# Patient Record
Sex: FEMALE | Race: WHITE | NOT HISPANIC OR LATINO | Employment: FULL TIME | URBAN - METROPOLITAN AREA
[De-identification: names, ages, dates, MRNs, and addresses within clinical notes are randomized per-mention and may not be internally consistent; named-entity substitution may affect disease eponyms.]

---

## 2022-08-18 ENCOUNTER — TELEPHONE (OUTPATIENT)
Dept: OBSTETRICS AND GYNECOLOGY | Age: 32
End: 2022-08-18

## 2022-08-18 NOTE — TELEPHONE ENCOUNTER
Caller: KAVEH    Relationship:  [employer] REPRESENTATIVE- REGARDING FMLA PAPERWORK    Best call back number:   [phone]    What form or medical record are you requesting: FMLA PAPERWORK       How would you like to receive the form or medical records (pick-up, mail, fax):FAX  If fax, what is the fax number: [phone] MEE LINN      Additional notes:REPRESENTATIVE CALLED ABOUT FMLA PAPERWORK FOR [employer], ON BEHALF OF THE PT. REP STATED THE COMPLETED PAPERWORK FOR PATIENT NEEDS TO HAVE THE END DATE OF POSTPARTUM RECOVERY TIME ONLY. NOT THE ADDITIONAL TIME PROVIDER ALLOTTED FOR PT TO BE OFF WORK.    PAPERWORK CAN BE FAXED [phone] MEE LINN   CALL BACK NUMBER AND CONFIDENTIAL [phone]

## 2023-08-22 ENCOUNTER — OFFICE VISIT (OUTPATIENT)
Dept: OBSTETRICS AND GYNECOLOGY | Age: 33
End: 2023-08-22
Payer: COMMERCIAL

## 2023-08-22 ENCOUNTER — TELEPHONE (OUTPATIENT)
Dept: OBSTETRICS AND GYNECOLOGY | Age: 33
End: 2023-08-22

## 2023-08-22 VITALS
HEIGHT: 64 IN | WEIGHT: 143 LBS | BODY MASS INDEX: 24.41 KG/M2 | DIASTOLIC BLOOD PRESSURE: 68 MMHG | SYSTOLIC BLOOD PRESSURE: 104 MMHG

## 2023-08-22 DIAGNOSIS — Z12.4 SCREENING FOR CERVICAL CANCER: ICD-10-CM

## 2023-08-22 DIAGNOSIS — Z30.09 GENERAL COUNSELING AND ADVICE FOR CONTRACEPTIVE MANAGEMENT: ICD-10-CM

## 2023-08-22 DIAGNOSIS — Z01.419 ENCOUNTER FOR GYNECOLOGICAL EXAMINATION: Primary | ICD-10-CM

## 2023-08-22 RX ORDER — ACETAMINOPHEN AND CODEINE PHOSPHATE 120; 12 MG/5ML; MG/5ML
1 SOLUTION ORAL DAILY
Qty: 84 TABLET | Refills: 3 | Status: SHIPPED | OUTPATIENT
Start: 2023-08-22

## 2023-08-22 RX ORDER — PNV NO.95/FERROUS FUM/FOLIC AC 28MG-0.8MG
1 TABLET ORAL DAILY
Qty: 30 TABLET | Refills: 11 | Status: SHIPPED | OUTPATIENT
Start: 2023-08-22

## 2023-08-22 NOTE — PROGRESS NOTES
Subjective     Chief Complaint   Patient presents with    Gynecologic Exam     Annual:last pap ,refill on ocp's         History of Present Illness    Wellness exam  Thomas Whitman is a very pleasant  33 y.o. female who presents for annual exam.  Mammo Exam age 38, Contraception yes, Exercise yes  Patient is happy on her OCP, might discontinue in the next year if they want to have another child.  I am going to send a prescription for prenatal vitamins as well to start taking 3 to 6 months before they try.  She has no gynecological concerns or complaints.  Overall remains quite healthy.  Her son is doing well..      Obstetric History:  OB History          1    Para   1    Term   1            AB        Living   1         SAB        IAB        Ectopic        Molar        Multiple   0    Live Births   1               Menstrual History:     Patient's last menstrual period was 2023 (approximate).       Sexual History:       Past Medical History:   Diagnosis Date    Abnormal Pap smear of cervix     Ascus, +hpv,2018    Anemia 2019    r/t GI bleeding    ASCUS with positive high risk HPV cervical     2018    Constipation     GI (gastrointestinal bleed) 10/2018-2019    resolved    History of chicken pox     History of shingles     HPV in female     UTI (urinary tract infection)      Past Surgical History:   Procedure Laterality Date    COLONOSCOPY  2019    chronic rectal inflamation    COLONOSCOPY W/ BIOPSIES  2019    friable (with spontaneous bleeding) and granular mucosa in rectum NBIH, per path chronic proctitis    ENDOSCOPY N/A 2020    Procedure: ESOPHAGOGASTRODUODENOSCOPY with cold bx;  Surgeon: Mandy Florence MD;  Location: [facility] ENDOSCOPY;  Service: Gastroenterology;  Laterality: N/A;  pre - epigastric pain, n/vpost - gastritis    WISDOM TOOTH EXTRACTION         SOCIAL Hx:      The following portions of the patient's history were reviewed and updated as appropriate:  "allergies, current medications, past family history, past medical history, past social history, past surgical history and problem list.    Review of Systems        Except as outlined in history of physical illness, patient denies any changes in her GYN, , GI systems.  All other systems reviewed were negative.         Current Outpatient Medications:     acetaminophen (TYLENOL) 325 MG tablet, Take 2 tablets by mouth Every 6 (Six) Hours As Needed for Mild Pain ., Disp: 30 tablet, Rfl: 1    ibuprofen (ADVIL,MOTRIN) 600 MG tablet, Take 1 tablet by mouth Every 6 (Six) Hours., Disp: 30 tablet, Rfl: 1    norethindrone (Reva) 0.35 MG tablet, Take 1 tablet by mouth Daily., Disp: 84 tablet, Rfl: 3    Prenatal Vit-Fe Fumarate-FA (Prenatal Vitamin) 27-0.8 MG tablet, Take 1 capsule by mouth Daily., Disp: 30 tablet, Rfl: 11    prenatal vitamin (prenatal, CLASSIC, vitamin) tablet, Take  by mouth Daily. (Patient not taking: Reported on 8/22/2023), Disp: , Rfl:    Objective   Physical Exam    /68   Ht 162.6 cm (64\")   Wt 64.9 kg (143 lb)   LMP 08/08/2023 (Approximate)   Breastfeeding No   BMI 24.55 kg/mý     General: Patient is alert and oriented and appears overall healthy  Neck: Is supple without thyromegaly, no carotid bruits and no lymphadenopathy  Lungs: Clear bilaterally, no wheezing, rhonchi, or rales.  Respiratory rate is normal  Breast: Even, symmetrical, no lymphadenopathy, no retraction, no masses or cysts  Heart: Regular rate and rhythm are appreciated, no murmurs or rubs are heard  Abdomen: Is soft, without organomegaly, bowel sounds are positive, there is no rebound or guarding and palpation does not produce any discomfort  Back: Nontender without CVA tenderness  Pelvic: External genitalia appear normal and consistent with mature female.  BUS normal                            Vagina is clean dry without discharge and appears adequately estrogenized, no lesions or masses are present                        "  Cervix is noninflamed without discharge or lesions.  There is no cervical motion tenderness.                Uterus is nonenlarged, without tenderness, and no masses or abnormalities are  present               Adnexa are non-enlarged, non tender               Rectal exam reveals adequate sphincter tone and no masses or lesions are appreciated on digital rectal examination.      Annual Well Woman Exam  Patient Active Problem List   Diagnosis    HPV in female    Epigastric pain    Nausea    Gastroesophageal reflux disease without esophagitis                 Assessment & Plan   Diagnoses and all orders for this visit:    1. Encounter for gynecological examination (Primary)    2. Screening for cervical cancer    3. General counseling and advice for contraceptive management    Other orders  -     norethindrone (Reva) 0.35 MG tablet; Take 1 tablet by mouth Daily.  Dispense: 84 tablet; Refill: 3  -     Prenatal Vit-Fe Fumarate-FA (Prenatal Vitamin) 27-0.8 MG tablet; Take 1 capsule by mouth Daily.  Dispense: 30 tablet; Refill: 11      Discussed today's findings and concerns with patient.  Continue to recommend regular exercise including cardiovascular and resistance training as well as  breast self-exam. Wellness lab, mammography, & pap smear, in accordance with age guidelines.    I have encouraged her to call for today's test results if she has not received them within 10 days.  Patient is advised to call with any change in her condition or with any other questions, otherwise return  for annual examination.

## 2023-08-22 NOTE — TELEPHONE ENCOUNTER
Caller: Thomas Gregory    Relationship: Self    Best call back number: [phone]    Who are you requesting to speak with (clinical staff, provider,  specific staff member): GUSTAVO    What was the call regarding: PT RETURNING A CALL

## 2023-08-24 LAB
CYTOLOGIST CVX/VAG CYTO: NORMAL
CYTOLOGY CVX/VAG DOC CYTO: NORMAL
CYTOLOGY CVX/VAG DOC THIN PREP: NORMAL
DX ICD CODE: NORMAL
HIV 1 & 2 AB SER-IMP: NORMAL
HPV I/H RISK 4 DNA CVX QL PROBE+SIG AMP: NEGATIVE
OTHER STN SPEC: NORMAL
STAT OF ADQ CVX/VAG CYTO-IMP: NORMAL

## 2024-02-26 ENCOUNTER — INITIAL PRENATAL (OUTPATIENT)
Dept: OBSTETRICS AND GYNECOLOGY | Age: 34
End: 2024-02-26
Payer: COMMERCIAL

## 2024-02-26 ENCOUNTER — PREP FOR SURGERY (OUTPATIENT)
Dept: OTHER | Facility: HOSPITAL | Age: 34
End: 2024-02-26
Payer: COMMERCIAL

## 2024-02-26 VITALS — SYSTOLIC BLOOD PRESSURE: 126 MMHG | DIASTOLIC BLOOD PRESSURE: 70 MMHG | BODY MASS INDEX: 24.89 KG/M2 | WEIGHT: 145 LBS

## 2024-02-26 DIAGNOSIS — O02.0 BLIGHTED OVUM: Primary | ICD-10-CM

## 2024-02-26 PROCEDURE — 99213 OFFICE O/P EST LOW 20 MIN: CPT | Performed by: OBSTETRICS & GYNECOLOGY

## 2024-02-26 RX ORDER — SODIUM CHLORIDE 0.9 % (FLUSH) 0.9 %
10 SYRINGE (ML) INJECTION EVERY 12 HOURS SCHEDULED
Status: CANCELLED | OUTPATIENT
Start: 2024-02-29

## 2024-02-26 RX ORDER — SODIUM CHLORIDE 0.9 % (FLUSH) 0.9 %
1-10 SYRINGE (ML) INJECTION AS NEEDED
Status: CANCELLED | OUTPATIENT
Start: 2024-02-29

## 2024-02-26 RX ORDER — SODIUM CHLORIDE 9 MG/ML
40 INJECTION, SOLUTION INTRAVENOUS AS NEEDED
Status: CANCELLED | OUTPATIENT
Start: 2024-02-29

## 2024-02-26 NOTE — PROGRESS NOTES
Subjective       History of Present Illness  Thomas Whitman is a 34 y.o. female is being seen today for irregular periods and confirmation of a possible pregnancy.  Unfortunately ultrasound shows a 9-1/2-week gestational sac with no fetal pole.  Patient's not started any vaginal bleeding she is known to be Rh+  Chief Complaint   Patient presents with    Initial Prenatal Visit     New ob visit  with ultrasound, LMP 2023   .        The following portions of the patient's history were reviewed and updated as appropriate: allergies, current medications, past family history, past medical history, past social history, past surgical history and problem list.    PAST MEDICAL HISTORY  Past Medical History:   Diagnosis Date    Abnormal Pap smear of cervix     Ascus, +hpv,2018    Anemia 2019    r/t GI bleeding    ASCUS with positive high risk HPV cervical     2018    Constipation     GI (gastrointestinal bleed) 10/2018-2019    resolved    History of chicken pox     History of shingles     HPV in female     UTI (urinary tract infection)      OB History    Para Term  AB Living   2 1 1     1   SAB IAB Ectopic Molar Multiple Live Births           0 1      # Outcome Date GA Lbr Cortez/2nd Weight Sex Delivery Anes PTL Lv   2 Current            1 Term 22 40w5d 09:24 / 01:51 3300 g (7 lb 4.4 oz) M Vag-Spont EPI N PAWAN      Birth Comments: panda LR 6     Past Surgical History:   Procedure Laterality Date    COLONOSCOPY  2019    chronic rectal inflamation    COLONOSCOPY W/ BIOPSIES  2019    friable (with spontaneous bleeding) and granular mucosa in rectum NBIH, per path chronic proctitis    ENDOSCOPY N/A 2020    Procedure: ESOPHAGOGASTRODUODENOSCOPY with cold bx;  Surgeon: Mandy Florence MD;  Location: [facility] ENDOSCOPY;  Service: Gastroenterology;  Laterality: N/A;  pre - epigastric pain, n/vpost - gastritis    WISDOM TOOTH EXTRACTION       Family History   Problem Relation Age of Onset     Multiple sclerosis Father     Atrial fibrillation Maternal Grandmother     Diabetes Maternal Grandfather     Colon polyps Maternal Uncle     Colon polyps Maternal Uncle     Irritable bowel syndrome Mother     Irritable bowel syndrome Maternal Aunt      Social History     Tobacco Use   Smoking Status Former    Types: Electronic Cigarette   Smokeless Tobacco Current   Tobacco Comments    quit cigarettes 11/2017.  use vaporizer since       Current Outpatient Medications:     acetaminophen (TYLENOL) 325 MG tablet, Take 2 tablets by mouth Every 6 (Six) Hours As Needed for Mild Pain ., Disp: 30 tablet, Rfl: 1    Prenatal Vit-Fe Fumarate-FA (Prenatal Vitamin) 27-0.8 MG tablet, Take 1 capsule by mouth Daily., Disp: 30 tablet, Rfl: 11    ibuprofen (ADVIL,MOTRIN) 600 MG tablet, Take 1 tablet by mouth Every 6 (Six) Hours. (Patient not taking: Reported on 2/26/2024), Disp: 30 tablet, Rfl: 1    norethindrone (Reva) 0.35 MG tablet, Take 1 tablet by mouth Daily. (Patient not taking: Reported on 2/26/2024), Disp: 84 tablet, Rfl: 3    prenatal vitamin (prenatal, CLASSIC, vitamin) tablet, Take  by mouth Daily. (Patient not taking: Reported on 8/22/2023), Disp: , Rfl:   Immunization History   Administered Date(s) Administered    COVID-19 (PFIZER) BIVALENT 12+YRS 05/24/2023    Fluzone (or Fluarix & Flulaval for VFC) >6mos 12/11/2020    Tdap 10/09/2018, 03/30/2022       Review of Systems       Except as outlined in history of physical illness, patient denies any changes in her GYN, , GI systems. All other systems reviewed are negative.    Objective   Physical Exam   Alert and oriented, respirations unlabored, heart regular rate and rhythm   Pelvic per ultrasound      Assessment & Plan   Diagnoses and all orders for this visit:    1. Blighted ovum (Primary)  Reviewed different management options patient would like to proceed with suction D&C  Patient is Rh+                 Orders Placed This Encounter   Procedures    Progesterone      Order Specific Question:   Release to patient     Answer:   Routine Release [[id number]]    OB Panel With HIV     Order Specific Question:   Release to patient     Answer:   Routine Release [[id number]]    HCG, B-subunit, Quantitative     Order Specific Question:   Release to patient     Answer:   Routine Release [[id number]]           EMR Dragon/ Transcription disclaimer:  Much of the encounter note is an electronic transcription/translation of spoken language to printed text. The electronic translation of spoken language may permit erroneous, or at times, nonessential words or phrases to be inadvertently transcribes; Although i have reviewed the note for such errors, some may still exist.

## 2024-02-29 ENCOUNTER — ANESTHESIA (OUTPATIENT)
Dept: PERIOP | Facility: HOSPITAL | Age: 34
End: 2024-02-29
Payer: COMMERCIAL

## 2024-02-29 ENCOUNTER — ANESTHESIA EVENT (OUTPATIENT)
Dept: PERIOP | Facility: HOSPITAL | Age: 34
End: 2024-02-29
Payer: COMMERCIAL

## 2024-02-29 ENCOUNTER — HOSPITAL ENCOUNTER (OUTPATIENT)
Facility: HOSPITAL | Age: 34
Setting detail: HOSPITAL OUTPATIENT SURGERY
Discharge: HOME | End: 2024-02-29
Attending: OBSTETRICS & GYNECOLOGY | Admitting: OBSTETRICS & GYNECOLOGY
Payer: COMMERCIAL

## 2024-02-29 VITALS
SYSTOLIC BLOOD PRESSURE: 107 MMHG | DIASTOLIC BLOOD PRESSURE: 63 MMHG | TEMPERATURE: 98.1 F | OXYGEN SATURATION: 99 % | RESPIRATION RATE: 16 BRPM | HEART RATE: 67 BPM

## 2024-02-29 DIAGNOSIS — O02.0 BLIGHTED OVUM: ICD-10-CM

## 2024-02-29 LAB
ABO GROUP BLD: NORMAL
ANISOCYTOSIS BLD QL: ABNORMAL
BLD GP AB SCN SERPL QL: NEGATIVE
DEPRECATED RDW RBC AUTO: 40 FL (ref 37–54)
ELLIPTOCYTES BLD QL SMEAR: ABNORMAL
EOSINOPHIL # BLD MANUAL: 0.07 10*3/MM3 (ref 0–0.4)
EOSINOPHIL NFR BLD MANUAL: 2 % (ref 0.3–6.2)
ERYTHROCYTE [DISTWIDTH] IN BLOOD BY AUTOMATED COUNT: 12.4 % (ref 12.3–15.4)
HCT VFR BLD AUTO: 41.1 % (ref 34–46.6)
HGB BLD-MCNC: 13.7 G/DL (ref 12–15.9)
LYMPHOCYTES # BLD MANUAL: 1.63 10*3/MM3 (ref 0.7–3.1)
LYMPHOCYTES NFR BLD MANUAL: 8 % (ref 5–12)
MCH RBC QN AUTO: 29.7 PG (ref 26.6–33)
MCHC RBC AUTO-ENTMCNC: 33.3 G/DL (ref 31.5–35.7)
MCV RBC AUTO: 89.2 FL (ref 79–97)
MONOCYTES # BLD: 0.27 10*3/MM3 (ref 0.1–0.9)
MYELOCYTES NFR BLD MANUAL: 0 % (ref 0–0)
NEUTROPHILS # BLD AUTO: 1.37 10*3/MM3 (ref 1.7–7)
NEUTROPHILS NFR BLD MANUAL: 41 % (ref 42.7–76)
NRBC BLD AUTO-RTO: 0 /100 WBC (ref 0–0.2)
PLAT MORPH BLD: NORMAL
PLATELET # BLD AUTO: 131 10*3/MM3 (ref 140–450)
PMV BLD AUTO: 11.9 FL (ref 6–12)
RBC # BLD AUTO: 4.61 10*6/MM3 (ref 3.77–5.28)
RH BLD: POSITIVE
T&S EXPIRATION DATE: NORMAL
VARIANT LYMPHS NFR BLD MANUAL: 3 % (ref 0–5)
VARIANT LYMPHS NFR BLD MANUAL: 46 % (ref 19.6–45.3)
WBC MORPH BLD: NORMAL
WBC NRBC COR # BLD AUTO: 3.33 10*3/MM3 (ref 3.4–10.8)

## 2024-02-29 PROCEDURE — S0260 H&P FOR SURGERY: HCPCS | Performed by: OBSTETRICS & GYNECOLOGY

## 2024-02-29 PROCEDURE — 25010000002 PROPOFOL 200 MG/20ML EMULSION: Performed by: NURSE ANESTHETIST, CERTIFIED REGISTERED

## 2024-02-29 PROCEDURE — 25010000002 MIDAZOLAM PER 1 MG: Performed by: ANESTHESIOLOGY

## 2024-02-29 PROCEDURE — 25010000002 KETOROLAC TROMETHAMINE PER 15 MG: Performed by: NURSE ANESTHETIST, CERTIFIED REGISTERED

## 2024-02-29 PROCEDURE — 86850 RBC ANTIBODY SCREEN: CPT | Performed by: OBSTETRICS & GYNECOLOGY

## 2024-02-29 PROCEDURE — 86901 BLOOD TYPING SEROLOGIC RH(D): CPT | Performed by: OBSTETRICS & GYNECOLOGY

## 2024-02-29 PROCEDURE — 25010000002 DEXAMETHASONE SODIUM PHOSPHATE 20 MG/5ML SOLUTION: Performed by: NURSE ANESTHETIST, CERTIFIED REGISTERED

## 2024-02-29 PROCEDURE — 86900 BLOOD TYPING SEROLOGIC ABO: CPT | Performed by: OBSTETRICS & GYNECOLOGY

## 2024-02-29 PROCEDURE — 25010000002 ONDANSETRON PER 1 MG: Performed by: NURSE ANESTHETIST, CERTIFIED REGISTERED

## 2024-02-29 PROCEDURE — 88305 TISSUE EXAM BY PATHOLOGIST: CPT | Performed by: OBSTETRICS & GYNECOLOGY

## 2024-02-29 PROCEDURE — 25010000002 FENTANYL CITRATE (PF) 50 MCG/ML SOLUTION: Performed by: NURSE ANESTHETIST, CERTIFIED REGISTERED

## 2024-02-29 PROCEDURE — 25810000003 LACTATED RINGERS PER 1000 ML: Performed by: ANESTHESIOLOGY

## 2024-02-29 PROCEDURE — 85007 BL SMEAR W/DIFF WBC COUNT: CPT | Performed by: OBSTETRICS & GYNECOLOGY

## 2024-02-29 PROCEDURE — 85025 COMPLETE CBC W/AUTO DIFF WBC: CPT | Performed by: OBSTETRICS & GYNECOLOGY

## 2024-02-29 PROCEDURE — 59812 TREATMENT OF MISCARRIAGE: CPT | Performed by: OBSTETRICS & GYNECOLOGY

## 2024-02-29 RX ORDER — SODIUM CHLORIDE 0.9 % (FLUSH) 0.9 %
3 SYRINGE (ML) INJECTION EVERY 12 HOURS SCHEDULED
Status: DISCONTINUED | OUTPATIENT
Start: 2024-02-29 | End: 2024-02-29 | Stop reason: HOSPADM

## 2024-02-29 RX ORDER — KETOROLAC TROMETHAMINE 30 MG/ML
INJECTION, SOLUTION INTRAMUSCULAR; INTRAVENOUS AS NEEDED
Status: DISCONTINUED | OUTPATIENT
Start: 2024-02-29 | End: 2024-02-29 | Stop reason: SURG

## 2024-02-29 RX ORDER — SODIUM CHLORIDE 0.9 % (FLUSH) 0.9 %
10 SYRINGE (ML) INJECTION EVERY 12 HOURS SCHEDULED
Status: DISCONTINUED | OUTPATIENT
Start: 2024-02-29 | End: 2024-02-29 | Stop reason: HOSPADM

## 2024-02-29 RX ORDER — OXYCODONE AND ACETAMINOPHEN 7.5; 325 MG/1; MG/1
1 TABLET ORAL EVERY 4 HOURS PRN
Status: DISCONTINUED | OUTPATIENT
Start: 2024-02-29 | End: 2024-02-29 | Stop reason: HOSPADM

## 2024-02-29 RX ORDER — LIDOCAINE HYDROCHLORIDE 10 MG/ML
0.5 INJECTION, SOLUTION INFILTRATION; PERINEURAL ONCE AS NEEDED
Status: DISCONTINUED | OUTPATIENT
Start: 2024-02-29 | End: 2024-02-29 | Stop reason: SDUPTHER

## 2024-02-29 RX ORDER — EPHEDRINE SULFATE 50 MG/ML
5 INJECTION, SOLUTION INTRAVENOUS ONCE AS NEEDED
Status: DISCONTINUED | OUTPATIENT
Start: 2024-02-29 | End: 2024-02-29 | Stop reason: HOSPADM

## 2024-02-29 RX ORDER — PROMETHAZINE HYDROCHLORIDE 25 MG/1
25 SUPPOSITORY RECTAL ONCE AS NEEDED
Status: DISCONTINUED | OUTPATIENT
Start: 2024-02-29 | End: 2024-02-29 | Stop reason: HOSPADM

## 2024-02-29 RX ORDER — SODIUM CHLORIDE 9 MG/ML
40 INJECTION, SOLUTION INTRAVENOUS AS NEEDED
Status: DISCONTINUED | OUTPATIENT
Start: 2024-02-29 | End: 2024-02-29 | Stop reason: HOSPADM

## 2024-02-29 RX ORDER — DROPERIDOL 2.5 MG/ML
0.62 INJECTION, SOLUTION INTRAMUSCULAR; INTRAVENOUS
Status: DISCONTINUED | OUTPATIENT
Start: 2024-02-29 | End: 2024-02-29 | Stop reason: HOSPADM

## 2024-02-29 RX ORDER — SODIUM CHLORIDE 0.9 % (FLUSH) 0.9 %
3-10 SYRINGE (ML) INJECTION AS NEEDED
Status: DISCONTINUED | OUTPATIENT
Start: 2024-02-29 | End: 2024-02-29 | Stop reason: HOSPADM

## 2024-02-29 RX ORDER — DEXAMETHASONE SODIUM PHOSPHATE 4 MG/ML
INJECTION, SOLUTION INTRA-ARTICULAR; INTRALESIONAL; INTRAMUSCULAR; INTRAVENOUS; SOFT TISSUE AS NEEDED
Status: DISCONTINUED | OUTPATIENT
Start: 2024-02-29 | End: 2024-02-29 | Stop reason: SURG

## 2024-02-29 RX ORDER — HYDRALAZINE HYDROCHLORIDE 20 MG/ML
5 INJECTION INTRAMUSCULAR; INTRAVENOUS
Status: DISCONTINUED | OUTPATIENT
Start: 2024-02-29 | End: 2024-02-29 | Stop reason: HOSPADM

## 2024-02-29 RX ORDER — DIPHENHYDRAMINE HYDROCHLORIDE 50 MG/ML
12.5 INJECTION INTRAMUSCULAR; INTRAVENOUS
Status: DISCONTINUED | OUTPATIENT
Start: 2024-02-29 | End: 2024-02-29 | Stop reason: HOSPADM

## 2024-02-29 RX ORDER — FENTANYL CITRATE 50 UG/ML
50 INJECTION, SOLUTION INTRAMUSCULAR; INTRAVENOUS ONCE AS NEEDED
Status: DISCONTINUED | OUTPATIENT
Start: 2024-02-29 | End: 2024-02-29 | Stop reason: HOSPADM

## 2024-02-29 RX ORDER — NALOXONE HCL 0.4 MG/ML
0.2 VIAL (ML) INJECTION AS NEEDED
Status: DISCONTINUED | OUTPATIENT
Start: 2024-02-29 | End: 2024-02-29 | Stop reason: HOSPADM

## 2024-02-29 RX ORDER — LIDOCAINE HYDROCHLORIDE 10 MG/ML
0.5 INJECTION, SOLUTION INFILTRATION; PERINEURAL ONCE AS NEEDED
Status: DISCONTINUED | OUTPATIENT
Start: 2024-02-29 | End: 2024-02-29 | Stop reason: HOSPADM

## 2024-02-29 RX ORDER — FAMOTIDINE 10 MG/ML
20 INJECTION, SOLUTION INTRAVENOUS ONCE
Status: COMPLETED | OUTPATIENT
Start: 2024-02-29 | End: 2024-02-29

## 2024-02-29 RX ORDER — LIDOCAINE HYDROCHLORIDE 20 MG/ML
INJECTION, SOLUTION INFILTRATION; PERINEURAL AS NEEDED
Status: DISCONTINUED | OUTPATIENT
Start: 2024-02-29 | End: 2024-02-29 | Stop reason: SURG

## 2024-02-29 RX ORDER — HYDROMORPHONE HYDROCHLORIDE 1 MG/ML
0.5 INJECTION, SOLUTION INTRAMUSCULAR; INTRAVENOUS; SUBCUTANEOUS
Status: DISCONTINUED | OUTPATIENT
Start: 2024-02-29 | End: 2024-02-29 | Stop reason: HOSPADM

## 2024-02-29 RX ORDER — PROMETHAZINE HYDROCHLORIDE 25 MG/1
25 TABLET ORAL ONCE AS NEEDED
Status: DISCONTINUED | OUTPATIENT
Start: 2024-02-29 | End: 2024-02-29 | Stop reason: HOSPADM

## 2024-02-29 RX ORDER — FENTANYL CITRATE 50 UG/ML
INJECTION, SOLUTION INTRAMUSCULAR; INTRAVENOUS AS NEEDED
Status: DISCONTINUED | OUTPATIENT
Start: 2024-02-29 | End: 2024-02-29 | Stop reason: SURG

## 2024-02-29 RX ORDER — FENTANYL CITRATE 50 UG/ML
50 INJECTION, SOLUTION INTRAMUSCULAR; INTRAVENOUS
Status: DISCONTINUED | OUTPATIENT
Start: 2024-02-29 | End: 2024-02-29 | Stop reason: HOSPADM

## 2024-02-29 RX ORDER — MAGNESIUM HYDROXIDE 1200 MG/15ML
LIQUID ORAL AS NEEDED
Status: DISCONTINUED | OUTPATIENT
Start: 2024-02-29 | End: 2024-02-29 | Stop reason: HOSPADM

## 2024-02-29 RX ORDER — ONDANSETRON 2 MG/ML
4 INJECTION INTRAMUSCULAR; INTRAVENOUS ONCE AS NEEDED
Status: DISCONTINUED | OUTPATIENT
Start: 2024-02-29 | End: 2024-02-29 | Stop reason: HOSPADM

## 2024-02-29 RX ORDER — ACETAMINOPHEN AND CODEINE PHOSPHATE 300; 30 MG/1; MG/1
2 TABLET ORAL EVERY 6 HOURS PRN
Qty: 12 TABLET | Refills: 0 | Status: SHIPPED | OUTPATIENT
Start: 2024-02-29 | End: 2025-02-28

## 2024-02-29 RX ORDER — PROPOFOL 10 MG/ML
INJECTION, EMULSION INTRAVENOUS AS NEEDED
Status: DISCONTINUED | OUTPATIENT
Start: 2024-02-29 | End: 2024-02-29 | Stop reason: SURG

## 2024-02-29 RX ORDER — MIDAZOLAM HYDROCHLORIDE 1 MG/ML
1 INJECTION INTRAMUSCULAR; INTRAVENOUS
Status: DISCONTINUED | OUTPATIENT
Start: 2024-02-29 | End: 2024-02-29 | Stop reason: SDUPTHER

## 2024-02-29 RX ORDER — SODIUM CHLORIDE, SODIUM LACTATE, POTASSIUM CHLORIDE, CALCIUM CHLORIDE 600; 310; 30; 20 MG/100ML; MG/100ML; MG/100ML; MG/100ML
9 INJECTION, SOLUTION INTRAVENOUS CONTINUOUS
Status: DISCONTINUED | OUTPATIENT
Start: 2024-02-29 | End: 2024-02-29 | Stop reason: SDUPTHER

## 2024-02-29 RX ORDER — ONDANSETRON 2 MG/ML
INJECTION INTRAMUSCULAR; INTRAVENOUS AS NEEDED
Status: DISCONTINUED | OUTPATIENT
Start: 2024-02-29 | End: 2024-02-29 | Stop reason: SURG

## 2024-02-29 RX ORDER — SODIUM CHLORIDE 0.9 % (FLUSH) 0.9 %
1-10 SYRINGE (ML) INJECTION AS NEEDED
Status: DISCONTINUED | OUTPATIENT
Start: 2024-02-29 | End: 2024-02-29 | Stop reason: HOSPADM

## 2024-02-29 RX ORDER — FLUMAZENIL 0.1 MG/ML
0.2 INJECTION INTRAVENOUS AS NEEDED
Status: DISCONTINUED | OUTPATIENT
Start: 2024-02-29 | End: 2024-02-29 | Stop reason: HOSPADM

## 2024-02-29 RX ORDER — FAMOTIDINE 10 MG/ML
20 INJECTION, SOLUTION INTRAVENOUS ONCE
Status: DISCONTINUED | OUTPATIENT
Start: 2024-02-29 | End: 2024-02-29 | Stop reason: SDUPTHER

## 2024-02-29 RX ORDER — LABETALOL HYDROCHLORIDE 5 MG/ML
5 INJECTION, SOLUTION INTRAVENOUS
Status: DISCONTINUED | OUTPATIENT
Start: 2024-02-29 | End: 2024-02-29 | Stop reason: HOSPADM

## 2024-02-29 RX ORDER — MIDAZOLAM HYDROCHLORIDE 1 MG/ML
1 INJECTION INTRAMUSCULAR; INTRAVENOUS
Status: DISCONTINUED | OUTPATIENT
Start: 2024-02-29 | End: 2024-02-29 | Stop reason: HOSPADM

## 2024-02-29 RX ORDER — HYDROCODONE BITARTRATE AND ACETAMINOPHEN 5; 325 MG/1; MG/1
1 TABLET ORAL ONCE AS NEEDED
Status: DISCONTINUED | OUTPATIENT
Start: 2024-02-29 | End: 2024-02-29 | Stop reason: HOSPADM

## 2024-02-29 RX ORDER — SODIUM CHLORIDE, SODIUM LACTATE, POTASSIUM CHLORIDE, CALCIUM CHLORIDE 600; 310; 30; 20 MG/100ML; MG/100ML; MG/100ML; MG/100ML
9 INJECTION, SOLUTION INTRAVENOUS CONTINUOUS
Status: DISCONTINUED | OUTPATIENT
Start: 2024-02-29 | End: 2024-02-29 | Stop reason: HOSPADM

## 2024-02-29 RX ORDER — IPRATROPIUM BROMIDE AND ALBUTEROL SULFATE 2.5; .5 MG/3ML; MG/3ML
3 SOLUTION RESPIRATORY (INHALATION) ONCE AS NEEDED
Status: DISCONTINUED | OUTPATIENT
Start: 2024-02-29 | End: 2024-02-29 | Stop reason: HOSPADM

## 2024-02-29 RX ORDER — FENTANYL CITRATE 50 UG/ML
50 INJECTION, SOLUTION INTRAMUSCULAR; INTRAVENOUS ONCE AS NEEDED
Status: DISCONTINUED | OUTPATIENT
Start: 2024-02-29 | End: 2024-02-29 | Stop reason: SDUPTHER

## 2024-02-29 RX ADMIN — DEXAMETHASONE SODIUM PHOSPHATE 8 MG: 4 INJECTION, SOLUTION INTRAMUSCULAR; INTRAVENOUS at 09:17

## 2024-02-29 RX ADMIN — LIDOCAINE HYDROCHLORIDE 100 MG: 20 INJECTION, SOLUTION INFILTRATION; PERINEURAL at 09:10

## 2024-02-29 RX ADMIN — FENTANYL CITRATE 50 MCG: 50 INJECTION, SOLUTION INTRAMUSCULAR; INTRAVENOUS at 09:13

## 2024-02-29 RX ADMIN — PROPOFOL 200 MG: 10 INJECTION, EMULSION INTRAVENOUS at 09:10

## 2024-02-29 RX ADMIN — ONDANSETRON 4 MG: 2 INJECTION INTRAMUSCULAR; INTRAVENOUS at 09:17

## 2024-02-29 RX ADMIN — FAMOTIDINE 20 MG: 10 INJECTION INTRAVENOUS at 08:25

## 2024-02-29 RX ADMIN — SODIUM CHLORIDE, POTASSIUM CHLORIDE, SODIUM LACTATE AND CALCIUM CHLORIDE 9 ML/HR: 600; 310; 30; 20 INJECTION, SOLUTION INTRAVENOUS at 08:25

## 2024-02-29 RX ADMIN — KETOROLAC TROMETHAMINE 30 MG: 30 INJECTION, SOLUTION INTRAMUSCULAR; INTRAVENOUS at 09:17

## 2024-02-29 RX ADMIN — MIDAZOLAM 1 MG: 1 INJECTION INTRAMUSCULAR; INTRAVENOUS at 08:25

## 2024-02-29 NOTE — ANESTHESIA PREPROCEDURE EVALUATION
Anesthesia Evaluation     Patient summary reviewed and Nursing notes reviewed   no history of anesthetic complications:   NPO Solid Status: > 8 hours  NPO Liquid Status: > 2 hours           Airway   Mallampati: II  TM distance: >3 FB  Neck ROM: full  No difficulty expected  Dental - normal exam     Pulmonary - negative pulmonary ROS and normal exam   (-) rhonchi, decreased breath sounds, wheezes, rales, stridor  Cardiovascular - negative cardio ROS and normal exam    NYHA Classification: I  Rhythm: regular  Rate: normal    (-) murmur, weak pulses, friction rub, systolic click, carotid bruits, JVD, peripheral edema      Neuro/Psych- negative ROS  GI/Hepatic/Renal/Endo    (+) GERD    Musculoskeletal (-) negative ROS    Abdominal    Substance History - negative use     OB/GYN    (-)  Pregnant        Other        (-) blood dyscrasia                Anesthesia Plan    ASA 2     general     intravenous induction     Anesthetic plan, risks, benefits, and alternatives have been provided, discussed and informed consent has been obtained with: patient.      CODE STATUS:

## 2024-02-29 NOTE — H&P
Richi Whitman  : 1990  MRN: [id number]  CSN: [id number]    History and Physical  No chief complaint on file.    Subjective   Thomas Whitman is a 34 y.o. year old  who present for surgery due to blighted ovum with incomplete miscarriage patient was evaluated this week and found to have an empty gestational sac with no fetal development.  According to last menstrual period gestational age there should have been fetal pole and cardiac activity and none was seen.  The sac was empty.  Patient is known to be Rh+.  Risk benefits potential complications of suction D&C reviewed..    Past Medical History:   Diagnosis Date    Abnormal Pap smear of cervix     Ascus, +hpv,2018    Anemia 2019    r/t GI bleeding    ASCUS with positive high risk HPV cervical     2018    Constipation     GI (gastrointestinal bleed) 10/2018-2019    resolved    History of chicken pox     History of shingles     HPV in female     UTI (urinary tract infection)      Past Surgical History:   Procedure Laterality Date    COLONOSCOPY  2019    chronic rectal inflamation    COLONOSCOPY W/ BIOPSIES  2019    friable (with spontaneous bleeding) and granular mucosa in rectum NBIH, per path chronic proctitis    ENDOSCOPY N/A 2020    Procedure: ESOPHAGOGASTRODUODENOSCOPY with cold bx;  Surgeon: Mandy Florence MD;  Location: [facility] ENDOSCOPY;  Service: Gastroenterology;  Laterality: N/A;  pre - epigastric pain, n/vpost - gastritis    WISDOM TOOTH EXTRACTION       Social History    Tobacco Use      Smoking status: Former        Types: Electronic Cigarette      Smokeless tobacco: Current      Tobacco comments: quit cigarettes 2017.  use vaporizer since    No current facility-administered medications for this encounter.    Allergies   Allergen Reactions    Amoxicillin Itching    Macrobid [Nitrofurantoin Monohyd Macro] Itching    Sulfa Antibiotics Itching       Review of Systems      Except as outlined in  history of physical illness, patient denies any changes in her GYN, , GI systems. All other systems reviewed are negative.        Objective   LMP 12/23/2023   General: well developed; well nourished  no acute distress  appears stated age   Heart: regular rate and rhythm, S1, S2 normal, no murmur, click, rub or gallop   Lungs: breathing is unlabored  clear to auscultation bilaterally   Abdomen: soft, non-tender; no masses  no umbilical or inguinal hernias are present  no hepato-splenomegaly   Pelvis:: Clinical staff was present for exam  External genitalia normal vagina with a little bit of blood in the upper vault, cervix is partially dilated, uterus is 7 weeks size, adnexa nonenlarged nontender rectal exam confirms   Labs  Lab Results   Component Value Date     07/08/2022    HGB 9.6 (L) 07/08/2022    HCT 29.5 (L) 07/08/2022    WBC 16.40 (H) 07/08/2022     02/06/2020    K 3.2 (L) 02/06/2020     02/06/2020    CO2 24.8 02/06/2020    BUN 10 02/06/2020    CREATININE 0.64 02/06/2020    GLUCOSE 96 02/06/2020    ALBUMIN 4.90 02/06/2020    CALCIUM 9.0 02/06/2020    AST 21 02/06/2020    ALT 17 02/06/2020    BILITOT 0.5 02/06/2020        Assessment   Blighted ovum, empty gestational sac     Plan   Admit for suction D&C.  Risk benefits potential complications reviewed    Tye Barkley MD  2/29/2024  07:19 EST       EMR Dragon/ Transcription disclaimer:  Much of the encounter note is an electronic transcription/translation of spoken language to printed text. The electronic translation of spoken language may permit erroneous, or at times, nonessential words or phrases to be inadvertently transcribes; Although i have reviewed the note for such errors, some may still exist.

## 2024-02-29 NOTE — ANESTHESIA PROCEDURE NOTES
Airway  Urgency: elective    Date/Time: 2/29/2024 9:11 AM  Airway not difficult    General Information and Staff    Patient location during procedure: OR  Anesthesiologist: Tye Centeno MD  CRNA/CAA: Syed Escobar CRNA    Indications and Patient Condition  Indications for airway management: airway protection    Preoxygenated: yes  Mask difficulty assessment: 1 - vent by mask    Final Airway Details  Final airway type: supraglottic airway      Successful airway: unique  Size 3     Number of attempts at approach: 1  Assessment: lips, teeth, and gum same as pre-op and atraumatic intubation    Additional Comments  Pre 02 100%, SIVI, atraumatic intubation, BLBS, Positive ETC02.

## 2024-02-29 NOTE — ANESTHESIA POSTPROCEDURE EVALUATION
Patient: Thomas Whitman    Procedure Summary       Date: 02/29/24 Room / Location: [facility] OR  / [facility] MAIN OR    Anesthesia Start: 0904 Anesthesia Stop: 0938    Procedure: SUCTION  DILATATION AND CURETTAGE (Vagina) Diagnosis:       Blighted ovum      (Blighted ovum [O02.0])    Surgeons: Tye Barkley MD Provider: Tye Centeno MD    Anesthesia Type: general ASA Status: 2            Anesthesia Type: general    Vitals  Vitals Value Taken Time   /73 02/29/24 0945   Temp     Pulse 68 02/29/24 0946   Resp     SpO2 99 % 02/29/24 0946   Vitals shown include unfiled device data.        Post Anesthesia Care and Evaluation    Patient location during evaluation: PACU  Patient participation: complete - patient participated  Level of consciousness: awake and alert  Pain management: adequate    Airway patency: patent  Anesthetic complications: No anesthetic complications  PONV Status: controlled  Cardiovascular status: acceptable  Respiratory status: acceptable  Hydration status: acceptable    Comments: /66 (BP Location: Right arm, Patient Position: Sitting)   Pulse 68   Temp 36.8 °C (98.3 °F) (Oral)   Resp 18   LMP 12/23/2023   SpO2 100%

## 2024-03-01 LAB
LAB AP CASE REPORT: NORMAL
PATH REPORT.FINAL DX SPEC: NORMAL
PATH REPORT.GROSS SPEC: NORMAL

## 2024-03-04 NOTE — PROGRESS NOTES
Pt c/o some noticeable pelvic pain when she is having a bowel movement. Pt wants to know if this is normal? Please advise

## 2024-03-04 NOTE — PROGRESS NOTES
Please let Thomas know pathology report was consistent with blighted ovum as we discussed.  Hopefully she is doing well.

## 2024-03-06 ENCOUNTER — OFFICE VISIT (OUTPATIENT)
Dept: OBSTETRICS AND GYNECOLOGY | Age: 34
End: 2024-03-06
Payer: COMMERCIAL

## 2024-03-06 ENCOUNTER — TELEPHONE (OUTPATIENT)
Dept: OBSTETRICS AND GYNECOLOGY | Age: 34
End: 2024-03-06

## 2024-03-06 VITALS
HEIGHT: 64 IN | DIASTOLIC BLOOD PRESSURE: 68 MMHG | SYSTOLIC BLOOD PRESSURE: 114 MMHG | BODY MASS INDEX: 24.92 KG/M2 | WEIGHT: 146 LBS

## 2024-03-06 DIAGNOSIS — Z98.890 POST-OPERATIVE STATE: Primary | ICD-10-CM

## 2024-03-06 RX ORDER — FEXOFENADINE HCL 180 MG/1
180 TABLET ORAL EVERY 24 HOURS
COMMUNITY

## 2024-03-06 RX ORDER — METHYLERGONOVINE MALEATE 0.2 MG/1
0.2 TABLET ORAL 2 TIMES DAILY
Qty: 10 TABLET | Refills: 0 | Status: SHIPPED | OUTPATIENT
Start: 2024-03-06 | End: 2024-03-11

## 2024-03-06 RX ORDER — CEPHALEXIN 500 MG/1
500 CAPSULE ORAL 4 TIMES DAILY
Qty: 20 CAPSULE | Refills: 0 | Status: SHIPPED | OUTPATIENT
Start: 2024-03-06 | End: 2024-03-11

## 2024-03-06 NOTE — TELEPHONE ENCOUNTER
"Pt calling stating she had a D&C on 2/29/24 & her pain has gotten worse. Pt stating the first few days she felt ok and was able to do her normal daily activities. Pt stating the last few days she has had spontaneous sharp pelvic pains with \"blueberry\" sized blood clots. Pt asking if this is normal? Please advise.   "

## 2024-03-06 NOTE — PROGRESS NOTES
Subjective       History of Present Illness  Thomas Whitman is a 34 y.o. female is being seen today for postoperative evaluation from her suction D&C  Patient had a blighted ovum underwent a suction D&C without complication.  She did well for the first couple days but last night's he passed some clots and that was associate with a lot of discomfort and wanted to come in to be seen.  She is afebrile GI  function are normal  Chief Complaint   Patient presents with    Post-op Follow-up     D&C 24,Having sharp pains with blueberry sized clots   .        The following portions of the patient's history were reviewed and updated as appropriate: allergies, current medications, past family history, past medical history, past social history, past surgical history and problem list.    PAST MEDICAL HISTORY  Past Medical History:   Diagnosis Date    Abnormal Pap smear of cervix     Ascus, +hpv,2018    Anemia 2019    r/t GI bleeding    ASCUS with positive high risk HPV cervical     2018    Constipation     GI (gastrointestinal bleed) 10/2018-2019    resolved    History of chicken pox     History of shingles     HPV in female     UTI (urinary tract infection)      OB History    Para Term  AB Living   2 1 1     1   SAB IAB Ectopic Molar Multiple Live Births           0 1      # Outcome Date GA Lbr Cortez/2nd Weight Sex Type Anes PTL Lv   2             1 Term 22 40w5d 09:24 / 01:51 3300 g (7 lb 4.4 oz) M Vag-Spont EPI N PAWAN      Birth Comments: jakcie LR 6     Past Surgical History:   Procedure Laterality Date    COLONOSCOPY  2019    chronic rectal inflamation    COLONOSCOPY W/ BIOPSIES  2019    friable (with spontaneous bleeding) and granular mucosa in rectum NBIH, per path chronic proctitis    D & C WITH SUCTION N/A 2024    Procedure: SUCTION  DILATATION AND CURETTAGE;  Surgeon: Tye Barkley MD;  Location: [facility] OR;  Service: Obstetrics/Gynecology;  Laterality: N/A;     ENDOSCOPY N/A 2/4/2020    Procedure: ESOPHAGOGASTRODUODENOSCOPY with cold bx;  Surgeon: Mandy Florence MD;  Location: [facility] ENDOSCOPY;  Service: Gastroenterology;  Laterality: N/A;  pre - epigastric pain, n/vpost - gastritis    WISDOM TOOTH EXTRACTION       Family History   Problem Relation Age of Onset    Multiple sclerosis Father     Atrial fibrillation Maternal Grandmother     Diabetes Maternal Grandfather     Colon polyps Maternal Uncle     Colon polyps Maternal Uncle     Irritable bowel syndrome Mother     Irritable bowel syndrome Maternal Aunt      Social History     Tobacco Use   Smoking Status Former    Types: Electronic Cigarette   Smokeless Tobacco Current   Tobacco Comments    quit cigarettes 11/2017.  use vaporizer since       Current Outpatient Medications:     acetaminophen-codeine (TYLENOL/CODEINE #3) 300-30 MG per tablet, Take 2 tablets by mouth Every 6 (Six) Hours As Needed for Moderate Pain., Disp: 12 tablet, Rfl: 0    fexofenadine (Allegra Allergy) 180 MG tablet, 1 tablet Daily., Disp: , Rfl:     ibuprofen (ADVIL,MOTRIN) 600 MG tablet, Take 1 tablet by mouth Every 6 (Six) Hours., Disp: 30 tablet, Rfl: 1    Prenatal Vit-Fe Fumarate-FA (Prenatal Vitamin) 27-0.8 MG tablet, Take 1 capsule by mouth Daily., Disp: 30 tablet, Rfl: 11    cephalexin (Keflex) 500 MG capsule, Take 1 capsule by mouth 4 (Four) Times a Day for 5 days., Disp: 20 capsule, Rfl: 0    methylergonovine (Methergine) 0.2 MG tablet, Take 1 tablet by mouth 2 (Two) Times a Day for 5 days., Disp: 10 tablet, Rfl: 0  Immunization History   Administered Date(s) Administered    COVID-19 (PFIZER) BIVALENT 12+YRS 05/24/2023    Fluzone (or Fluarix & Flulaval for VFC) >6mos 12/11/2020    Tdap 10/09/2018, 03/30/2022       Review of Systems       Except as outlined in history of physical illness, patient denies any changes in her GYN, , GI systems. All other systems reviewed are negative.    Objective   Physical Exam   Alert and oriented,  respirations unlabored, heart regular rate and rhythm   Pelvic external genitalia normal vagina shows some old blood in the vaginal vault.  Cervical os has closed back down uterus is normal size it is not tender to palpation no adnexal enlargement or discomfort  Abdomen is soft nontender no rebound no guarding no organomegaly positive bowel sounds all 4 quadrants      Assessment & Plan   Diagnoses and all orders for this visit:    1. Post-operative state (Primary)    Other orders  -     cephalexin (Keflex) 500 MG capsule; Take 1 capsule by mouth 4 (Four) Times a Day for 5 days.  Dispense: 20 capsule; Refill: 0  -     methylergonovine (Methergine) 0.2 MG tablet; Take 1 tablet by mouth 2 (Two) Times a Day for 5 days.  Dispense: 10 tablet; Refill: 0    Likely that patient had some blood clots that passed causing her discomfort but will prescribe above-mentioned medicines to make sure there is not a developing endometritis.  Pathology was reviewed and consistent with blighted ovum.  Patient will call us with any change in her condition.             No orders of the defined types were placed in this encounter.          EMR Dragon/ Transcription disclaimer:  Much of the encounter note is an electronic transcription/translation of spoken language to printed text. The electronic translation of spoken language may permit erroneous, or at times, nonessential words or phrases to be inadvertently transcribes; Although i have reviewed the note for such errors, some may still exist.  Answers submitted by the patient for this visit:  Primary Reason for Visit (Submitted on 3/6/2024)  What is the primary reason for your visit?: Abdominal Pain

## 2024-03-14 ENCOUNTER — OFFICE VISIT (OUTPATIENT)
Dept: OBSTETRICS AND GYNECOLOGY | Age: 34
End: 2024-03-14
Payer: COMMERCIAL

## 2024-03-14 VITALS
DIASTOLIC BLOOD PRESSURE: 64 MMHG | HEIGHT: 64 IN | WEIGHT: 147 LBS | SYSTOLIC BLOOD PRESSURE: 110 MMHG | BODY MASS INDEX: 25.1 KG/M2

## 2024-03-14 DIAGNOSIS — Z98.890 POST-OPERATIVE STATE: Primary | ICD-10-CM

## 2024-03-14 RX ORDER — POLYETHYLENE GLYCOL 3350 17 G/17G
17 POWDER, FOR SOLUTION ORAL EVERY 24 HOURS
COMMUNITY

## 2024-03-14 NOTE — PROGRESS NOTES
Subjective       History of Present Illness  Thomas Whitman is a 34 y.o. female is being seen today for postop visit from suction D&C for blighted ovum D&C  Pathology was consistent  Patient did well had a little bit of extra cramping a day or 2 afterwards and that resolved and she says she feels very normal again.  Normal GI  function she does not have any pain does not have any fever or chills  Chief Complaint   Patient presents with    Post-op Follow-up     D&C 24   .        The following portions of the patient's history were reviewed and updated as appropriate: allergies, current medications, past family history, past medical history, past social history, past surgical history and problem list.    PAST MEDICAL HISTORY  Past Medical History:   Diagnosis Date    Abnormal Pap smear of cervix     Ascus, +hpv,2018    Anemia 2019    r/t GI bleeding    ASCUS with positive high risk HPV cervical     2018    Constipation     GI (gastrointestinal bleed) 10/2018-2019    resolved    History of chicken pox     History of shingles     HPV in female     UTI (urinary tract infection)      OB History    Para Term  AB Living   2 1 1     1   SAB IAB Ectopic Molar Multiple Live Births           0 1      # Outcome Date GA Lbr Cortez/2nd Weight Sex Type Anes PTL Lv   2             1 Term 22 40w5d 09:24 / 01:51 3300 g (7 lb 4.4 oz) M Vag-Spont EPI N PAWAN      Birth Comments: jackie LR 6     Past Surgical History:   Procedure Laterality Date    COLONOSCOPY  2019    chronic rectal inflamation    COLONOSCOPY W/ BIOPSIES  2019    friable (with spontaneous bleeding) and granular mucosa in rectum NBIH, per path chronic proctitis    D & C WITH SUCTION N/A 2024    Procedure: SUCTION  DILATATION AND CURETTAGE;  Surgeon: Tye Barkley MD;  Location: [facility] MAIN OR;  Service: Obstetrics/Gynecology;  Laterality: N/A;    ENDOSCOPY N/A 2020    Procedure: ESOPHAGOGASTRODUODENOSCOPY with cold  bx;  Surgeon: Mandy Florence MD;  Location: [facility] ENDOSCOPY;  Service: Gastroenterology;  Laterality: N/A;  pre - epigastric pain, n/vpost - gastritis    WISDOM TOOTH EXTRACTION       Family History   Problem Relation Age of Onset    Multiple sclerosis Father     Atrial fibrillation Maternal Grandmother     Diabetes Maternal Grandfather     Colon polyps Maternal Uncle     Colon polyps Maternal Uncle     Irritable bowel syndrome Mother     Irritable bowel syndrome Maternal Aunt      Social History     Tobacco Use   Smoking Status Former    Types: Electronic Cigarette   Smokeless Tobacco Current   Tobacco Comments    quit cigarettes 11/2017.  use vaporizer since       Current Outpatient Medications:     fexofenadine (Allegra Allergy) 180 MG tablet, 1 tablet Daily., Disp: , Rfl:     ibuprofen (ADVIL,MOTRIN) 600 MG tablet, Take 1 tablet by mouth Every 6 (Six) Hours., Disp: 30 tablet, Rfl: 1    Prenatal Vit-Fe Fumarate-FA (Prenatal Vitamin) 27-0.8 MG tablet, Take 1 capsule by mouth Daily., Disp: 30 tablet, Rfl: 11    acetaminophen-codeine (TYLENOL/CODEINE #3) 300-30 MG per tablet, Take 2 tablets by mouth Every 6 (Six) Hours As Needed for Moderate Pain. (Patient not taking: Reported on 3/14/2024), Disp: 12 tablet, Rfl: 0    polyethylene glycol (MiraLax) 17 GM/SCOOP powder, 17 g Daily. (Patient not taking: Reported on 3/14/2024), Disp: , Rfl:   Immunization History   Administered Date(s) Administered    COVID-19 (PFIZER) BIVALENT 12+YRS 05/24/2023    Fluzone (or Fluarix & Flulaval for VFC) >6mos 12/11/2020    Tdap 10/09/2018, 03/30/2022       Review of Systems       Except as outlined in history of physical illness, patient denies any changes in her GYN, , GI systems. All other systems reviewed are negative.    Objective   Physical Exam   Alert and oriented, respirations unlabored, heart regular rate and rhythm   Pelvic external genitalia female vagina clean dry intact cervix uterus are normal size adnexa  nonenlarged nontender      Assessment & Plan   Diagnoses and all orders for this visit:    1. Post-operative state (Primary)    Reassuring pelvic exam will wait 2-3 cycles before they try to get pregnant continue prenatal vitamins but call ASAP when she gets pregnant again             No orders of the defined types were placed in this encounter.          EMR Dragon/ Transcription disclaimer:  Much of the encounter note is an electronic transcription/translation of spoken language to printed text. The electronic translation of spoken language may permit erroneous, or at times, nonessential words or phrases to be inadvertently transcribes; Although i have reviewed the note for such errors, some may still exist.  Answers submitted by the patient for this visit:  Other (Submitted on 3/13/2024)  Please describe your symptoms.: D&C surgical follow up  Have you had these symptoms before?: No  How long have you been having these symptoms?: 1-2 weeks  Please list any medications you are currently taking for this condition.: Keflex  Primary Reason for Visit (Submitted on 3/13/2024)  What is the primary reason for your visit?: Other

## 2024-06-08 ENCOUNTER — HOSPITAL ENCOUNTER (EMERGENCY)
Facility: HOSPITAL | Age: 34
Discharge: HOME | End: 2024-06-08
Attending: EMERGENCY MEDICINE | Admitting: EMERGENCY MEDICINE
Payer: COMMERCIAL

## 2024-06-08 VITALS
OXYGEN SATURATION: 100 % | WEIGHT: 145 LBS | HEART RATE: 88 BPM | HEIGHT: 64 IN | TEMPERATURE: 97.2 F | DIASTOLIC BLOOD PRESSURE: 86 MMHG | RESPIRATION RATE: 16 BRPM | SYSTOLIC BLOOD PRESSURE: 124 MMHG | BODY MASS INDEX: 24.75 KG/M2

## 2024-06-08 DIAGNOSIS — S05.12XA EYE CONTUSION, LEFT, INITIAL ENCOUNTER: Primary | ICD-10-CM

## 2024-06-08 PROCEDURE — 99283 EMERGENCY DEPT VISIT LOW MDM: CPT

## 2024-06-08 RX ORDER — TETRACAINE HYDROCHLORIDE 5 MG/ML
2 SOLUTION OPHTHALMIC ONCE
Status: COMPLETED | OUTPATIENT
Start: 2024-06-08 | End: 2024-06-08

## 2024-06-08 RX ADMIN — TETRACAINE HYDROCHLORIDE 2 DROP: 5 SOLUTION OPHTHALMIC at 11:54

## 2024-06-08 RX ADMIN — FLUORESCEIN SODIUM 1 STRIP: 1 STRIP OPHTHALMIC at 11:53

## 2024-06-08 NOTE — ED PROVIDER NOTES
EMERGENCY DEPARTMENT ENCOUNTER  Room Number:    PCP: Provider, No Known  Independent Historians: Patient      HPI:  Chief Complaint: had concerns including Eye Problem.     A complete HPI/ROS/PMH/PSH/SH/FH are unobtainable due to: Nothing      Context: Thomas Whitman is a 34 y.o. female  who presents to the ED c/o acute left eye pain after her 2-year-old toddler inadvertently kicked her in the left eye with a shoe on.  She denies blurry vision.  She denies headache, nausea, vomiting.      Review of prior external notes (non-ED) -and- Review of prior external test results outside of this encounter: N/A        PAST MEDICAL HISTORY  Active Ambulatory Problems     Diagnosis Date Noted    HPV in female 2018    Epigastric pain 2020    Nausea 2020    Gastroesophageal reflux disease without esophagitis 2022    Blighted ovum 2024     Resolved Ambulatory Problems     Diagnosis Date Noted    Iron deficiency anemia 2020    Personal history of COVID-19 2022    GBS (group B Streptococcus carrier), +RV culture, currently pregnant 2022    Pregnancy 2022     (normal spontaneous vaginal delivery) 2022     Past Medical History:   Diagnosis Date    Abnormal Pap smear of cervix     Anemia 2019    ASCUS with positive high risk HPV cervical     Constipation     GI (gastrointestinal bleed) 10/2018-2019    History of chicken pox     History of shingles     UTI (urinary tract infection)          PAST SURGICAL HISTORY  Past Surgical History:   Procedure Laterality Date    COLONOSCOPY  2019    chronic rectal inflamation    COLONOSCOPY W/ BIOPSIES  2019    friable (with spontaneous bleeding) and granular mucosa in rectum NBIH, per path chronic proctitis    D & C WITH SUCTION N/A 2024    Procedure: SUCTION  DILATATION AND CURETTAGE;  Surgeon: Tye Barkley MD;  Location: [facility] MAIN OR;  Service: Obstetrics/Gynecology;  Laterality: N/A;    ENDOSCOPY N/A  2/4/2020    Procedure: ESOPHAGOGASTRODUODENOSCOPY with cold bx;  Surgeon: Mandy Florence MD;  Location: [facility] ENDOSCOPY;  Service: Gastroenterology;  Laterality: N/A;  pre - epigastric pain, n/vpost - gastritis    WISDOM TOOTH EXTRACTION           FAMILY HISTORY  Family History   Problem Relation Age of Onset    Multiple sclerosis Father     Atrial fibrillation Maternal Grandmother     Diabetes Maternal Grandfather     Colon polyps Maternal Uncle     Colon polyps Maternal Uncle     Irritable bowel syndrome Mother     Irritable bowel syndrome Maternal Aunt          SOCIAL HISTORY  Social History     Socioeconomic History    Marital status:    Tobacco Use    Smoking status: Former     Types: Electronic Cigarette    Smokeless tobacco: Current    Tobacco comments:     quit cigarettes 11/2017.  use vaporizer since   Vaping Use    Vaping status: Never Used   Substance and Sexual Activity    Alcohol use: Yes     Alcohol/week: 1.0 standard drink of alcohol     Types: 1 Glasses of wine per week     Comment: four beers per month    Drug use: No    Sexual activity: Yes     Partners: Male     Birth control/protection: Condom         ALLERGIES  Amoxicillin, Macrobid [nitrofurantoin monohyd macro], and Sulfa antibiotics      REVIEW OF SYSTEMS  Review of all 14 systems is negative other than stated in the HPI above.      PHYSICAL EXAM    I have reviewed the triage vital signs and nursing notes.    ED Triage Vitals   Temp Heart Rate Resp BP SpO2   06/08/24 1123 06/08/24 1123 06/08/24 1123 06/08/24 1135 06/08/24 1123   97.2 °F (36.2 °C) 88 16 124/86 100 %      Temp src Heart Rate Source Patient Position BP Location FiO2 (%)   -- -- -- -- --                GENERAL: awake and alert, well-appearing, no acute distress  HENT: Normocephalic, atraumatic  EYES: no scleral icterus.  Pupils 3 mm reactive bilaterally, EOMI.  There is trace subconjunctival hemorrhage along the lateral aspect of the left eye.  There is no abnormal  fluorescein uptake on the left cornea or sclera.  No ocular or lid foreign body left eye.  Left eye intraocular pressure is 19 as measured with iCare 200.  Visual acuity grossly intact bilaterally.  CV: regular rhythm, regular rate  RESPIRATORY: normal effort  MUSCULOSKELETAL: no deformity  NEURO: alert, moves all extremities, follows commands  PSYCH: calm, cooperative  SKIN: Warm, dry          LAB RESULTS  No results found for this or any previous visit (from the past 24 hour(s)).    The above labs were ordered by me and independently reviewed by me.     RADIOLOGY  No Radiology Exams Resulted Within Past 24 Hours    The above radiology studies were ordered by me.  See ED course for independent interpretations.     MEDICATIONS GIVEN IN ER  Medications   fluorescein ophthalmic strip 1 strip (1 strip Both Eyes Given by Other 6/8/24 1468)   tetracaine (ALTACAINE) 0.5 % ophthalmic solution 2 drop (2 drops Right Eye Given by Other 6/8/24 4642)         ORDERS PLACED DURING THIS VISIT:  Orders Placed This Encounter   Procedures    Supplies To Bedside - Notify MD When Ready- Hassan Lamp, W[facility]o Pen         OUTPATIENT MEDICATION MANAGEMENT:  No current Epic-ordered facility-administered medications on file.     Current Outpatient Medications Ordered in Epic   Medication Sig Dispense Refill    acetaminophen-codeine (TYLENOL/CODEINE #3) 300-30 MG per tablet Take 2 tablets by mouth Every 6 (Six) Hours As Needed for Moderate Pain. (Patient not taking: Reported on 3/14/2024) 12 tablet 0    fexofenadine (Allegra Allergy) 180 MG tablet 1 tablet Daily.      ibuprofen (ADVIL,MOTRIN) 600 MG tablet Take 1 tablet by mouth Every 6 (Six) Hours. 30 tablet 1    polyethylene glycol (MiraLax) 17 GM/SCOOP powder 17 g Daily. (Patient not taking: Reported on 3/14/2024)      Prenatal Vit-Fe Fumarate-FA (Prenatal Vitamin) 27-0.8 MG tablet Take 1 capsule by mouth Daily. 30 tablet 11         PROCEDURES  Procedures            PROGRESS, DATA ANALYSIS,  CONSULTS, AND MEDICAL DECISION MAKING  All labs have been independently interpreted by me.  All radiology studies have been reviewed by me. All EKG's have been independently viewed and interpreted by me.  Discussion below represents my analysis of pertinent findings related to patient's condition, differential diagnosis, treatment plan and final disposition.    Clinical Scores:                       No indication for antibiotics.  Patient advised to use NSAIDs as needed for pain, artificial tears for lubrication as needed.  Return precautions were discussed.      AS OF 12:07 EDT VITALS:    BP - 124/86  HR - 88  TEMP - 97.2 °F (36.2 °C)  O2 SATS - 100%    COMPLEXITY OF CARE        Chronic or social conditions impacting patient care (Social Determinants of Health):     DIAGNOSIS  Final diagnoses:   Eye contusion, left, initial encounter           DISPOSITION  DISCHARGE    Patient discharged in stable condition.    Reviewed implications of results, diagnosis, meds, responsibility to follow up, warning signs and symptoms of possible worsening, potential complications and reasons to return to ER.    Patient/Family voiced understanding of above instructions.    Discussed plan for discharge, as there is no emergent indication for admission. Patient referred to primary care provider for BP management due to today's BP. Pt/family is agreeable and understands need for follow up and repeat testing.  Pt is aware that discharge does not mean that nothing is wrong but it indicates no emergency is present that requires admission and they must continue care with follow-up as given below or physician of their choice.     FOLLOW-UP  Osman Lovelace MD  [address] [place] [id number] [phone]      As needed         Medication List      No changes were made to your prescriptions during this visit.             Prescription drug monitoring program review:           Please note that portions of this document were  completed with a voice recognition program.    Note Disclaimer: At [facility], we believe that sharing information builds trust and better relationships. You are receiving this note because you recently visited [facility]. It is possible you will see health information before a provider has talked with you about it. This kind of information can be easy to misunderstand. To help you fully understand what it means for your health, we urge you to discuss this note with your provider.         Joseph Prescott MD  06/08/24 3831

## 2024-06-08 NOTE — ED NOTES
"Patient to ER via car from home for \"my son kicked me in the eye with his shoe on accident\" reports L eye irritation like something is in it   No vision issues at time of triage   "

## 2024-06-19 ENCOUNTER — TELEPHONE (OUTPATIENT)
Dept: OBSTETRICS AND GYNECOLOGY | Age: 34
End: 2024-06-19
Payer: COMMERCIAL

## 2024-06-19 NOTE — TELEPHONE ENCOUNTER
Pt called stating she has a positive pregnancy test and her LMP is 5/23/24. Pt states she had a miscarriage in February and that Dr. Barkley wants her seen sooner. Please advise scheduling

## 2024-06-24 ENCOUNTER — OFFICE VISIT (OUTPATIENT)
Dept: OBSTETRICS AND GYNECOLOGY | Age: 34
End: 2024-06-24
Payer: COMMERCIAL

## 2024-06-24 VITALS
SYSTOLIC BLOOD PRESSURE: 118 MMHG | BODY MASS INDEX: 24.92 KG/M2 | HEIGHT: 64 IN | DIASTOLIC BLOOD PRESSURE: 64 MMHG | WEIGHT: 146 LBS

## 2024-06-24 DIAGNOSIS — O09.521 AMA (ADVANCED MATERNAL AGE) MULTIGRAVIDA 35+, FIRST TRIMESTER: Primary | ICD-10-CM

## 2024-06-24 DIAGNOSIS — R11.0 NAUSEA: ICD-10-CM

## 2024-06-24 PROBLEM — O02.0 BLIGHTED OVUM: Status: RESOLVED | Noted: 2024-02-26 | Resolved: 2024-06-24

## 2024-06-24 PROBLEM — R10.13 EPIGASTRIC PAIN: Status: RESOLVED | Noted: 2020-01-16 | Resolved: 2024-06-24

## 2024-06-24 LAB
CLARITY, POC: CLEAR
COLOR UR: YELLOW
GLUCOSE UR STRIP-MCNC: NEGATIVE MG/DL
PROT UR STRIP-MCNC: NEGATIVE MG/DL

## 2024-06-24 PROCEDURE — 99213 OFFICE O/P EST LOW 20 MIN: CPT | Performed by: OBSTETRICS & GYNECOLOGY

## 2024-06-24 NOTE — PROGRESS NOTES
Subjective       History of Present Illness  Thomas Whitman is a 34 y.o. female is being seen today for irregular menses and confirmation of early intrauterine pregnancy  Patient had a blighted ovum D&C in 2024  Last menstrual period was 23 May think she conceived on  she has had no vaginal bleeding she does have some mild nausea but no emesis she is currently taking her prenatal vitamins  Chief Complaint   Patient presents with    Gynecologic Exam     Gyn: early ob ,lmp 24   .        The following portions of the patient's history were reviewed and updated as appropriate: allergies, current medications, past family history, past medical history, past social history, past surgical history and problem list.    PAST MEDICAL HISTORY  Past Medical History:   Diagnosis Date    Abnormal Pap smear of cervix     Ascus, +hpv,2018    Anemia 2019    r/t GI bleeding    ASCUS with positive high risk HPV cervical     2018    Constipation     GI (gastrointestinal bleed) 10/2018-2019    resolved    History of chicken pox     History of shingles     HPV in female     UTI (urinary tract infection)      OB History    Para Term  AB Living   2 1 1     1   SAB IAB Ectopic Molar Multiple Live Births           1 1      # Outcome Date GA Lbr Cortez/2nd Weight Sex Type Anes PTL Lv   2A             2B             1 Term 22 40w5d 09:24 / 01:51 3300 g (7 lb 4.4 oz) M Vag-Spont EPI N PAWAN      Birth Comments: jackie LR 6     Past Surgical History:   Procedure Laterality Date    COLONOSCOPY  2019    chronic rectal inflamation    COLONOSCOPY W/ BIOPSIES  2019    friable (with spontaneous bleeding) and granular mucosa in rectum NBIH, per path chronic proctitis    D & C WITH SUCTION N/A 2024    Procedure: SUCTION  DILATATION AND CURETTAGE;  Surgeon: Tye Barkley MD;  Location: [facility] MAIN OR;  Service: Obstetrics/Gynecology;  Laterality: N/A;    ENDOSCOPY N/A 2020     Procedure: ESOPHAGOGASTRODUODENOSCOPY with cold bx;  Surgeon: Mandy Florence MD;  Location: [facility] ENDOSCOPY;  Service: Gastroenterology;  Laterality: N/A;  pre - epigastric pain, n/vpost - gastritis    WISDOM TOOTH EXTRACTION       Family History   Problem Relation Age of Onset    Multiple sclerosis Father     Atrial fibrillation Maternal Grandmother     Diabetes Maternal Grandfather     Colon polyps Maternal Uncle     Colon polyps Maternal Uncle     Irritable bowel syndrome Mother     Irritable bowel syndrome Maternal Aunt      Social History     Tobacco Use   Smoking Status Former    Types: Electronic Cigarette   Smokeless Tobacco Current   Tobacco Comments    quit cigarettes 11/2017.  use vaporizer since       Current Outpatient Medications:     fexofenadine (Allegra Allergy) 180 MG tablet, 1 tablet Daily., Disp: , Rfl:     polyethylene glycol (MiraLax) 17 GM/SCOOP powder, 17 g Daily., Disp: , Rfl:     Prenatal Vit-Fe Fumarate-FA (Prenatal Vitamin) 27-0.8 MG tablet, Take 1 capsule by mouth Daily., Disp: 30 tablet, Rfl: 11  Immunization History   Administered Date(s) Administered    COVID-19 (PFIZER) BIVALENT 12+YRS 05/24/2023    Fluzone (or Fluarix & Flulaval for VFC) >6mos 12/11/2020    Tdap 10/09/2018, 03/30/2022       Review of Systems       Except as outlined in history of physical illness, patient denies any changes in her GYN, , GI systems. All other systems reviewed are negative.    Objective   Physical Exam   Alert and oriented, respirations unlabored, heart regular rate and rhythm   Pelvic external genitalia female vagina clean dry intact cervix is nulliparous uterus is enlarged 5 weeks size adnexa nonenlarged nontender      Assessment & Plan   Diagnoses and all orders for this visit:    1. AMA (advanced maternal age) multigravida 35+, first trimester (Primary)  -     OB Panel With HIV  -     Progesterone  -     HCG, B-subunit, Quantitative    2. Nausea  -     OB Panel With HIV  -      Progesterone  -     HCG, B-subunit, Quantitative      Clinical exam consistent with early intrauterine pregnancy with last menstrual period of May 23 conception date somewhere around 4 June we will see what her hormone levels look like today come back in 1 to 2 weeks for first trimester ultrasound.  Patient will call with any change in her condition or with any other  questions           Orders Placed This Encounter   Procedures    OB Panel With HIV     Order Specific Question:   Release to patient     Answer:   Routine Release [[id number]]    Progesterone     Order Specific Question:   Release to patient     Answer:   Routine Release [[id number]]    HCG, B-subunit, Quantitative     Order Specific Question:   Release to patient     Answer:   Routine Release [[id number]]           EMR Dragon/ Transcription disclaimer:  Much of the encounter note is an electronic transcription/translation of spoken language to printed text. The electronic translation of spoken language may permit erroneous, or at times, nonessential words or phrases to be inadvertently transcribes; Although i have reviewed the note for such errors, some may still exist.

## 2024-06-25 LAB
ABO GROUP BLD: NORMAL
BASOPHILS # BLD AUTO: 0 X10E3/UL (ref 0–0.2)
BASOPHILS NFR BLD AUTO: 1 %
BLD GP AB SCN SERPL QL: NEGATIVE
EOSINOPHIL # BLD AUTO: 0.1 X10E3/UL (ref 0–0.4)
EOSINOPHIL NFR BLD AUTO: 2 %
ERYTHROCYTE [DISTWIDTH] IN BLOOD BY AUTOMATED COUNT: 12.9 % (ref 11.7–15.4)
HBV SURFACE AG SERPL QL IA: NEGATIVE
HCG INTACT+B SERPL-ACNC: 1796 MIU/ML
HCT VFR BLD AUTO: 40.8 % (ref 34–46.6)
HCV IGG SERPL QL IA: NON REACTIVE
HGB BLD-MCNC: 13.7 G/DL (ref 11.1–15.9)
HIV 1+2 AB+HIV1 P24 AG SERPL QL IA: NON REACTIVE
IMM GRANULOCYTES # BLD AUTO: 0 X10E3/UL (ref 0–0.1)
IMM GRANULOCYTES NFR BLD AUTO: 0 %
LYMPHOCYTES # BLD AUTO: 1.5 X10E3/UL (ref 0.7–3.1)
LYMPHOCYTES NFR BLD AUTO: 31 %
MCH RBC QN AUTO: 29.8 PG (ref 26.6–33)
MCHC RBC AUTO-ENTMCNC: 33.6 G/DL (ref 31.5–35.7)
MCV RBC AUTO: 89 FL (ref 79–97)
MONOCYTES # BLD AUTO: 0.3 X10E3/UL (ref 0.1–0.9)
MONOCYTES NFR BLD AUTO: 7 %
NEUTROPHILS # BLD AUTO: 2.8 X10E3/UL (ref 1.4–7)
NEUTROPHILS NFR BLD AUTO: 59 %
PLATELET # BLD AUTO: 203 X10E3/UL (ref 150–450)
PROGEST SERPL-MCNC: 17.8 NG/ML
RBC # BLD AUTO: 4.59 X10E6/UL (ref 3.77–5.28)
RH BLD: POSITIVE
RPR SER QL: NON REACTIVE
RUBV IGG SERPL IA-ACNC: 1.58 INDEX
WBC # BLD AUTO: 4.8 X10E3/UL (ref 3.4–10.8)

## 2024-06-26 LAB
BACTERIA UR CULT: NO GROWTH
BACTERIA UR CULT: NORMAL

## 2024-07-09 ENCOUNTER — ROUTINE PRENATAL (OUTPATIENT)
Dept: OBSTETRICS AND GYNECOLOGY | Age: 34
End: 2024-07-09
Payer: COMMERCIAL

## 2024-07-09 VITALS — SYSTOLIC BLOOD PRESSURE: 106 MMHG | DIASTOLIC BLOOD PRESSURE: 70 MMHG | WEIGHT: 149 LBS | BODY MASS INDEX: 25.58 KG/M2

## 2024-07-09 DIAGNOSIS — Z34.81 PRENATAL CARE, SUBSEQUENT PREGNANCY, FIRST TRIMESTER: Primary | ICD-10-CM

## 2024-07-09 PROBLEM — O09.529 ANTEPARTUM MULTIGRAVIDA OF ADVANCED MATERNAL AGE: Status: ACTIVE | Noted: 2024-07-09

## 2024-07-09 PROCEDURE — 0502F SUBSEQUENT PRENATAL CARE: CPT | Performed by: OBSTETRICS & GYNECOLOGY

## 2024-07-09 NOTE — PROGRESS NOTES
Chief Complaint   Patient presents with    Initial Prenatal Visit     NOB:lmp 24,u/s today     HPI- Pt is 34 y.o.  at Unknown here for prenatal visit.     ROS-     - No vaginal bleeding    GI- No abdominal pain    /70   Wt 67.6 kg (149 lb)   LMP 2024 (Approximate)   BMI 25.58 kg/m²   Exam - See flow sheet    Fetal heart rate is normal    Assessment-  Diagnoses and all orders for this visit:    Prenatal care, subsequent pregnancy, first trimester  -     POC Urinalysis Dipstick  -     Urine Culture - Urine, Urine, Clean Catch      ReSound reassuring, 6 weeks 6-day single intrauterine pregnancy, NIKOLAY 226  Small 1 cm subchorionic hemorrhage  Positive cardiac cavity  No free fluid  New OB labs reviewed

## 2024-07-11 LAB
BACTERIA UR CULT: NO GROWTH
BACTERIA UR CULT: NORMAL

## 2024-08-06 ENCOUNTER — TELEPHONE (OUTPATIENT)
Dept: OBSTETRICS AND GYNECOLOGY | Age: 34
End: 2024-08-06
Payer: COMMERCIAL

## 2024-08-06 NOTE — TELEPHONE ENCOUNTER
Caller: Thomas Whitman    Relationship: Self    Best call back number: [phone]    What form or medical record are you requesting: DENTIST CLEARANCE LETTER ASAP    Who is requesting this form or medical record from you: [facility] (DR. SULTANA KATZ) @ [address] [facility]#[phone]    How would you like to receive the form or medical records (pick-up, mail, fax): FAX    If fax, what is the fax number: [phone]      If mail, what is the address:   If pick-up, provide patient with address and location details    Timeframe paperwork needed: ASAP, SHE IS GOING IN AT 11AM, JUST FOUND OUT HER APPT TIME    Additional notes: APPT AT 11AM SHE MAY HAVE TO HAVE ROOT CANAL

## 2024-08-07 ENCOUNTER — ROUTINE PRENATAL (OUTPATIENT)
Dept: OBSTETRICS AND GYNECOLOGY | Age: 34
End: 2024-08-07
Payer: COMMERCIAL

## 2024-08-07 VITALS — DIASTOLIC BLOOD PRESSURE: 68 MMHG | WEIGHT: 149 LBS | BODY MASS INDEX: 25.58 KG/M2 | SYSTOLIC BLOOD PRESSURE: 104 MMHG

## 2024-08-07 DIAGNOSIS — O09.529 ANTEPARTUM MULTIGRAVIDA OF ADVANCED MATERNAL AGE: ICD-10-CM

## 2024-08-07 DIAGNOSIS — Z34.81 PRENATAL CARE, SUBSEQUENT PREGNANCY, FIRST TRIMESTER: Primary | ICD-10-CM

## 2024-08-07 NOTE — PROGRESS NOTES
Chief Complaint   Patient presents with    Routine Prenatal Visit     HPI- Pt is 34 y.o.  at 11w0d here for prenatal visit.     ROS-     - No vaginal bleeding    GI- No abdominal pain    /68   Wt 67.6 kg (149 lb)   LMP 2024 (Approximate)   BMI 25.58 kg/m²   Exam - See flow sheet    Fetal heart rate is normal    Assessment-  Diagnoses and all orders for this visit:    Prenatal care, subsequent pregnancy, first trimester  -     POC Urinalysis Dipstick    Antepartum multigravida of advanced maternal age    Patient doing well handheld ultrasound reassuring, cell free DNA today,    Root canal in 2 to 4 weeks    Anatomy ultrasound in 8 weeks

## 2024-08-13 ENCOUNTER — TELEPHONE (OUTPATIENT)
Dept: OBSTETRICS AND GYNECOLOGY | Age: 34
End: 2024-08-13
Payer: COMMERCIAL

## 2024-08-13 LAB
Lab: NORMAL
NTRA FETAL FRACTION: NORMAL
NTRA GENDER OF FETUS: NORMAL
NTRA MONOSOMY X AGE-BASED RISK TEXT: NORMAL
NTRA MONOSOMY X RESULT TEXT: NORMAL
NTRA MONOSOMY X RISK SCORE TEXT: NORMAL
NTRA TRIPLOIDY RESULT TEXT: NORMAL
NTRA TRISOMY 13 AGE-BASED RISK TEXT: NORMAL
NTRA TRISOMY 13 RESULT TEXT: NORMAL
NTRA TRISOMY 13 RISK SCORE TEXT: NORMAL
NTRA TRISOMY 18 AGE-BASED RISK TEXT: NORMAL
NTRA TRISOMY 18 RESULT TEXT: NORMAL
NTRA TRISOMY 18 RISK SCORE TEXT: NORMAL
NTRA TRISOMY 21 AGE-BASED RISK TEXT: NORMAL
NTRA TRISOMY 21 RESULT TEXT: NORMAL
NTRA TRISOMY 21 RISK SCORE TEXT: NORMAL

## 2024-08-13 NOTE — TELEPHONE ENCOUNTER
----- Message from Tye Barkley sent at 8/13/2024  1:16 PM EDT -----  Please let patient know that studies came back low risk and if she wants to know gender male  ----- Message -----  From: Linh Olivas MA  Sent: 8/7/2024   9:42 AM EDT  To: Tye Barkley MD

## 2024-08-20 LAB
Lab: NEGATIVE
Lab: NORMAL
NTRA ALPHA-THALASSEMIA: NEGATIVE
NTRA BETA-HEMOGLOBINOPATHIES: NEGATIVE
NTRA CANAVAN DISEASE: NEGATIVE
NTRA CYSTIC FIBROSIS: NEGATIVE
NTRA DUCHENNE/BECKER MUSCULAR DYSTROPHY: NEGATIVE
NTRA FAMILIAL DYSAUTONOMIA: NEGATIVE
NTRA FRAGILE X SYNDROME: NEGATIVE
NTRA GALACTOSEMIA: NEGATIVE
NTRA GAUCHER DISEASE: NEGATIVE
NTRA MEDIUM CHAIN ACYL-COA DEHYDROGENASE DEFICIENCY: NEGATIVE
NTRA POLYCYSTIC KIDNEY DISEASE, AUTOSOMAL RECESSIVE: NEGATIVE
NTRA SMITH-LEMLI-OPITZ SYNDROME: NEGATIVE
NTRA SPINAL MUSCULAR ATROPHY: NEGATIVE
NTRA TAY-SACHS DISEASE: NEGATIVE

## 2024-09-04 ENCOUNTER — ROUTINE PRENATAL (OUTPATIENT)
Dept: OBSTETRICS AND GYNECOLOGY | Age: 34
End: 2024-09-04
Payer: COMMERCIAL

## 2024-09-04 VITALS — SYSTOLIC BLOOD PRESSURE: 108 MMHG | WEIGHT: 156 LBS | DIASTOLIC BLOOD PRESSURE: 64 MMHG | BODY MASS INDEX: 26.78 KG/M2

## 2024-09-04 DIAGNOSIS — Z3A.15 15 WEEKS GESTATION OF PREGNANCY: Primary | ICD-10-CM

## 2024-09-04 DIAGNOSIS — O09.529 ANTEPARTUM MULTIGRAVIDA OF ADVANCED MATERNAL AGE: ICD-10-CM

## 2024-09-04 LAB
GLUCOSE UR STRIP-MCNC: NEGATIVE MG/DL
PROT UR STRIP-MCNC: NEGATIVE MG/DL

## 2024-09-04 PROCEDURE — 0502F SUBSEQUENT PRENATAL CARE: CPT | Performed by: PHYSICIAN ASSISTANT

## 2024-09-04 NOTE — PROGRESS NOTES
Chief Complaint   Patient presents with    Routine Prenatal Visit     15 week ob visit       HPI: 34 y.o.  at 15w0d gestation  She is here for ob visit  She is noting improvement in her sxs-nausea and fatigue  Did receive results of her NIPS testing and knows gender  It's a boy!  Will do AFP testing today  AMA, will start ASA 81 mg  Has anatomy scan scheduled next month  Call for any issues      Vitals:    24 1516   BP: 108/64   Weight: 70.8 kg (156 lb)       ROS:  GI:  Negative  : na  Pulmonary: Negative     A/P  1. Intrauterine pregnancy at 15w0d   2. Pregnancy Risk:  HIGH RISK    Diagnoses and all orders for this visit:    1. 15 weeks gestation of pregnancy (Primary)  -     POC Urinalysis Dipstick, Multipro  -     Alpha Fetoprotein, Maternal    2. Antepartum multigravida of advanced maternal age        -----------------------  PLAN:   Return in about 4 weeks (around 10/2/2024) for anatomy scan.      VIKTORIA Morris  2024 15:33 EDT

## 2024-09-07 LAB
AFP INTERP SERPL-IMP: NORMAL
AFP INTERP SERPL-IMP: NORMAL
AFP MOM SERPL: 0.85
AFP SERPL-MCNC: 23.9 NG/ML
AGE AT DELIVERY: 37.1 YR
GA METHOD: NORMAL
GA: 15 WEEKS
IDDM PATIENT QL: NO
LABORATORY COMMENT REPORT: NORMAL
MULTIPLE PREGNANCY: NO
NEURAL TUBE DEFECT RISK FETUS: NORMAL %
RESULT: NORMAL

## 2024-10-02 ENCOUNTER — ROUTINE PRENATAL (OUTPATIENT)
Dept: OBSTETRICS AND GYNECOLOGY | Age: 34
End: 2024-10-02
Payer: COMMERCIAL

## 2024-10-02 VITALS — WEIGHT: 160 LBS | SYSTOLIC BLOOD PRESSURE: 102 MMHG | BODY MASS INDEX: 27.46 KG/M2 | DIASTOLIC BLOOD PRESSURE: 68 MMHG

## 2024-10-02 DIAGNOSIS — Z34.82 PRENATAL CARE, SUBSEQUENT PREGNANCY, SECOND TRIMESTER: Primary | ICD-10-CM

## 2024-10-02 RX ORDER — ASPIRIN 81 MG/1
81 TABLET ORAL DAILY
Qty: 60 TABLET | Refills: 2 | Status: SHIPPED | OUTPATIENT
Start: 2024-10-02

## 2024-10-02 NOTE — PROGRESS NOTES
Chief Complaint   Patient presents with    Pregnancy Ultrasound     HPI- Pt is 34 y.o.  at 19w0d here for prenatal visit.     OB History    Para Term  AB Living   3 1 1     1   SAB IAB Ectopic Molar Multiple Live Births           1 1      # Outcome Date GA Lbr Cortez/2nd Weight Sex Type Anes PTL Lv   3 Current            2 Term 22 40w5d 09:24 / 01:51 3300 g (7 lb 4.4 oz) M Vag-Spont EPI N PAWAN      Birth Comments: panda LR 6   1A             1B                  Wt Readings from Last 3 Encounters:   10/02/24 72.6 kg (160 lb)   24 70.8 kg (156 lb)   24 67.6 kg (149 lb)     Temp Readings from Last 3 Encounters:   24 97.2 °F (36.2 °C)   24 98.1 °F (36.7 °C)   22 97.8 °F (36.6 °C) (Oral)     BP Readings from Last 3 Encounters:   10/02/24 102/68   24 108/64   24 104/68     Pulse Readings from Last 3 Encounters:   24 88   24 67   22 80        Last OB US growth (since 5/15/2024)       None             ROS-     - No vaginal bleeding    GI- No abdominal pain    /68   Wt 72.6 kg (160 lb)   LMP 2024 (Approximate)   BMI 27.46 kg/m²   Exam - See flow sheet        Assessment-  Diagnoses and all orders for this visit:    1. Prenatal care, subsequent pregnancy, second trimester (Primary)  -     POC Urinalysis Dipstick    Other orders  -     aspirin 81 MG EC tablet; Take 1 tablet by mouth Daily.  Dispense: 60 tablet; Refill: 2    Reassuring ultrasound, anatomy incomplete we will recheck in 2 or 3 visits  Darnell managing heartburn with diet modification and over-the-counter meds  Return to the office in 4 weeks, 8 weeks for prenatal in 10 weeks for repeat ultrasound

## 2024-10-30 ENCOUNTER — ROUTINE PRENATAL (OUTPATIENT)
Dept: OBSTETRICS AND GYNECOLOGY | Age: 34
End: 2024-10-30
Payer: COMMERCIAL

## 2024-10-30 VITALS — BODY MASS INDEX: 28.36 KG/M2 | SYSTOLIC BLOOD PRESSURE: 106 MMHG | WEIGHT: 165.2 LBS | DIASTOLIC BLOOD PRESSURE: 68 MMHG

## 2024-10-30 DIAGNOSIS — Z3A.23 23 WEEKS GESTATION OF PREGNANCY: Primary | ICD-10-CM

## 2024-10-30 LAB
GLUCOSE UR STRIP-MCNC: NEGATIVE MG/DL
PROT UR STRIP-MCNC: NEGATIVE MG/DL

## 2024-10-30 NOTE — PROGRESS NOTES
Thomas Whitman, a 34 y.o.  at 23w0d, presents for OB follow-up.  She is doing well today.  Denies loss of fluid, vaginal bleeding, and contractions.  Endorses fetal movements.    Objective  BP Readings from Last 3 Encounters:   10/30/24 106/68   10/02/24 102/68   24 108/64      Wt Readings from Last 3 Encounters:   10/30/24 74.9 kg (165 lb 3.2 oz)   10/02/24 72.6 kg (160 lb)   24 70.8 kg (156 lb)      Total weight gain this pregnancy: Not found.    Review of systems:   Gen: negative  CV:     negative  GI: negative  :   negative and good fetal movement noted   MS:    negative  Neuro: negative and denies headaches and visual changes   Pul: negative    A/P  Diagnoses and all orders for this visit:    1. 23 weeks gestation of pregnancy (Primary)  -     POC Urinalysis Dipstick      Flu shot today  TDAP and Glucose next visit  Growth and finish anatomy at 28-30 weeks   labor was discussed.  Warnings were provided.    4 weeks as scheduled    NEELAM Chaudhari

## 2024-11-27 ENCOUNTER — ROUTINE PRENATAL (OUTPATIENT)
Dept: OBSTETRICS AND GYNECOLOGY | Age: 34
End: 2024-11-27
Payer: COMMERCIAL

## 2024-11-27 VITALS — BODY MASS INDEX: 29.18 KG/M2 | WEIGHT: 170 LBS | SYSTOLIC BLOOD PRESSURE: 102 MMHG | DIASTOLIC BLOOD PRESSURE: 60 MMHG

## 2024-11-27 DIAGNOSIS — Z34.82 PRENATAL CARE, SUBSEQUENT PREGNANCY, SECOND TRIMESTER: Primary | ICD-10-CM

## 2024-11-27 DIAGNOSIS — O09.529 ANTEPARTUM MULTIGRAVIDA OF ADVANCED MATERNAL AGE: ICD-10-CM

## 2024-11-27 NOTE — PROGRESS NOTES
Chief Complaint   Patient presents with    Routine Prenatal Visit     1 hr gtt     HPI- Pt is 34 y.o.  at 27w0d here for prenatal visit.     OB History    Para Term  AB Living   3 1 1     1   SAB IAB Ectopic Molar Multiple Live Births           1 1      # Outcome Date GA Lbr Cortez/2nd Weight Sex Type Anes PTL Lv   3 Current            2 Term 22 40w5d 09:24 / 01:51 3300 g (7 lb 4.4 oz) M Vag-Spont EPI N PAWAN      Birth Comments: panda LR 6   1A             1B                  Wt Readings from Last 3 Encounters:   24 77.1 kg (170 lb)   10/30/24 74.9 kg (165 lb 3.2 oz)   10/02/24 72.6 kg (160 lb)     Temp Readings from Last 3 Encounters:   24 97.2 °F (36.2 °C)   24 98.1 °F (36.7 °C)   22 97.8 °F (36.6 °C) (Oral)     BP Readings from Last 3 Encounters:   24 102/60   10/30/24 106/68   10/02/24 102/68     Pulse Readings from Last 3 Encounters:   24 88   24 67   22 80        Last OB US Data (since 2024)       None             ROS-     - No vaginal bleeding    GI- No abdominal pain    /60   Wt 77.1 kg (170 lb)   LMP 2024 (Approximate)   BMI 29.18 kg/m²   Exam - See flow sheet        Assessment-  Diagnoses and all orders for this visit:    1. Prenatal care, subsequent pregnancy, second trimester (Primary)  -     Gestational Screen 1 Hr ([facility])  -     Hemoglobin  -     RPR Qualitative with Reflex to Quant  -     POC Urinalysis Dipstick    2. Antepartum multigravida of advanced maternal age       Glucose hemoglobin Tdap RPR today  Repeat anatomy ultrasound next visit

## 2024-11-28 LAB
GLUCOSE 1H P 50 G GLC PO SERPL-MCNC: 124 MG/DL (ref 70–139)
HGB BLD-MCNC: 12.3 G/DL (ref 11.1–15.9)
RPR SER QL: NON REACTIVE

## 2024-12-18 ENCOUNTER — ROUTINE PRENATAL (OUTPATIENT)
Dept: OBSTETRICS AND GYNECOLOGY | Age: 34
End: 2024-12-18
Payer: COMMERCIAL

## 2024-12-18 VITALS — WEIGHT: 171 LBS | BODY MASS INDEX: 29.35 KG/M2 | DIASTOLIC BLOOD PRESSURE: 64 MMHG | SYSTOLIC BLOOD PRESSURE: 110 MMHG

## 2024-12-18 DIAGNOSIS — Z34.83 PRENATAL CARE, SUBSEQUENT PREGNANCY, THIRD TRIMESTER: Primary | ICD-10-CM

## 2024-12-18 NOTE — PROGRESS NOTES
Chief Complaint   Patient presents with    Pregnancy Ultrasound     HPI- Pt is 34 y.o.  at 30w0d here for prenatal visit.     OB History    Para Term  AB Living   3 1 1     1   SAB IAB Ectopic Molar Multiple Live Births           1 1      # Outcome Date GA Lbr Cortez/2nd Weight Sex Type Anes PTL Lv   3 Current            2 Term 22 40w5d 09:24 / :51 3300 g (7 lb 4.4 oz) M Vag-Spont EPI N PAWAN      Birth Comments: panda LR 6   1A             1B                  Wt Readings from Last 3 Encounters:   24 77.6 kg (171 lb)   24 77.1 kg (170 lb)   10/30/24 74.9 kg (165 lb 3.2 oz)     Temp Readings from Last 3 Encounters:   24 97.2 °F (36.2 °C)   24 98.1 °F (36.7 °C)   22 97.8 °F (36.6 °C) (Oral)     BP Readings from Last 3 Encounters:   24 110/64   24 102/60   10/30/24 106/68     Pulse Readings from Last 3 Encounters:   24 88   24 67   22 80        Last OB US Data (since 2024)       None             ROS-     - No vaginal bleeding    GI- No abdominal pain    /64   Wt 77.6 kg (171 lb)   LMP 2024 (Approximate)   BMI 29.35 kg/m²   Exam - See flow sheet        Assessment-  Diagnoses and all orders for this visit:    1. Prenatal care, subsequent pregnancy, third trimester (Primary)  -     POC Urinalysis Dipstick       Patient doing well follow-up anatomy ultrasound reassuring 53rd percentile estimated fetal weight anatomy within limits of ultrasound diagnostic capabilities

## 2025-01-02 ENCOUNTER — ROUTINE PRENATAL (OUTPATIENT)
Dept: OBSTETRICS AND GYNECOLOGY | Age: 35
End: 2025-01-02
Payer: COMMERCIAL

## 2025-01-02 VITALS — WEIGHT: 178 LBS | BODY MASS INDEX: 30.55 KG/M2 | DIASTOLIC BLOOD PRESSURE: 68 MMHG | SYSTOLIC BLOOD PRESSURE: 112 MMHG

## 2025-01-02 DIAGNOSIS — O09.529 ANTEPARTUM MULTIGRAVIDA OF ADVANCED MATERNAL AGE: ICD-10-CM

## 2025-01-02 DIAGNOSIS — R12 HEARTBURN: ICD-10-CM

## 2025-01-02 DIAGNOSIS — Z3A.32 32 WEEKS GESTATION OF PREGNANCY: Primary | ICD-10-CM

## 2025-01-02 LAB
GLUCOSE UR STRIP-MCNC: NEGATIVE MG/DL
PROT UR STRIP-MCNC: NEGATIVE MG/DL

## 2025-01-02 NOTE — PROGRESS NOTES
Chief Complaint   Patient presents with    Routine Prenatal Visit     32 week ob visit, would like RSV vaccine today.        HPI: 34 y.o.  at 32w1d gestation  She is here for ob visit  She is doing well  Notes good FM  Dealing with HB  Takes pepcid occasionally but still having symptoms  May  take it daily instead  Disc avoiding eating/drinking 2 hours before bed  Received RSV today  F/u as scheduled in 2 wks        Vitals:    25 1445   BP: 112/68   Weight: 80.7 kg (178 lb)       ROS:  GI:  Negative  : na  Pulmonary: Negative     A/P  1. Intrauterine pregnancy at 32w1d   2. Pregnancy Risk:  HIGH RISK    Diagnoses and all orders for this visit:    1. 32 weeks gestation of pregnancy (Primary)  -     POC Urinalysis Dipstick, Multipro    2. Antepartum multigravida of advanced maternal age    3. Heartburn    Other orders  -     ABRYSVO RSV Vaccine (Adults 60+, pregnant women 32-36 wks)        -----------------------  PLAN:   Return in about 2 weeks (around 2025) for belly check.      VIKTORIA Morris  2025 15:01 EST

## 2025-01-13 ENCOUNTER — HOSPITAL ENCOUNTER (EMERGENCY)
Facility: HOSPITAL | Age: 35
Discharge: HOME | End: 2025-01-13
Attending: OBSTETRICS & GYNECOLOGY | Admitting: OBSTETRICS & GYNECOLOGY
Payer: COMMERCIAL

## 2025-01-13 VITALS
WEIGHT: 180 LBS | BODY MASS INDEX: 30.73 KG/M2 | DIASTOLIC BLOOD PRESSURE: 77 MMHG | HEIGHT: 64 IN | HEART RATE: 101 BPM | TEMPERATURE: 98 F | SYSTOLIC BLOOD PRESSURE: 125 MMHG | RESPIRATION RATE: 16 BRPM | OXYGEN SATURATION: 100 %

## 2025-01-13 LAB
A1 MICROGLOB PLACENTAL VAG QL: NEGATIVE
BACTERIA UR QL AUTO: ABNORMAL /HPF
BILIRUB UR QL STRIP: NEGATIVE
CLARITY UR: ABNORMAL
COLOR UR: YELLOW
GLUCOSE UR STRIP-MCNC: NEGATIVE MG/DL
HGB UR QL STRIP.AUTO: NEGATIVE
HYALINE CASTS UR QL AUTO: ABNORMAL /LPF
KETONES UR QL STRIP: NEGATIVE
LEUKOCYTE ESTERASE UR QL STRIP.AUTO: ABNORMAL
NITRITE UR QL STRIP: NEGATIVE
PH UR STRIP.AUTO: 7.5 [PH] (ref 5–8)
PROT UR QL STRIP: NEGATIVE
RBC # UR STRIP: ABNORMAL /HPF
REF LAB TEST METHOD: ABNORMAL
SP GR UR STRIP: <=1.005 (ref 1–1.03)
SQUAMOUS #/AREA URNS HPF: ABNORMAL /HPF
UROBILINOGEN UR QL STRIP: ABNORMAL
WBC # UR STRIP: ABNORMAL /HPF

## 2025-01-13 PROCEDURE — 59025 FETAL NON-STRESS TEST: CPT

## 2025-01-13 PROCEDURE — 99284 EMERGENCY DEPT VISIT MOD MDM: CPT | Performed by: OBSTETRICS & GYNECOLOGY

## 2025-01-13 PROCEDURE — 81001 URINALYSIS AUTO W/SCOPE: CPT | Performed by: OBSTETRICS & GYNECOLOGY

## 2025-01-13 PROCEDURE — 84112 EVAL AMNIOTIC FLUID PROTEIN: CPT | Performed by: OBSTETRICS & GYNECOLOGY

## 2025-01-13 PROCEDURE — 87086 URINE CULTURE/COLONY COUNT: CPT | Performed by: OBSTETRICS & GYNECOLOGY

## 2025-01-13 RX ORDER — FAMOTIDINE 10 MG
10 TABLET ORAL 2 TIMES DAILY
COMMUNITY

## 2025-01-13 NOTE — OBED NOTES
[facility] Note [facility]        Patient Name: Thomas Whitman  YOB: 1990  MRN: [id number]  Admission Date: 2025 12:35 PM  Date of Service: 2025    Chief Complaint: Leaking Fluid (Pt states she has noticed increased clear vaginal discharge that began this morning. Endorses +Fm. Denies Vb or ctx. VSS)        Subjective     Thomas Whitman is a 35 y.o. female  at 33w5d with Estimated Date of Delivery: 25 who presents with the chief complaint listed above.  She sees Tye Barkley MD for her prenatal care. Her pregnancy has been complicated by:   AMA, GERD .        She describes fetal movement as normal.  She denies rupture of membranes.  She denies vaginal bleeding. She is not feeling contractions.          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.7]         OB History    Para Term  AB Living   3 1 1 0 0 1   SAB IAB Ectopic Molar Multiple Live Births   0 0 0 0 1 1      # Outcome Date GA Lbr Cortez/2nd Weight Sex Type Anes PTL Lv   3 Current            2 Term 22 40w5d 09:24  01:51 3300 g (7 lb 4.4 oz) M Vag-Spont EPI N PAWAN      Birth Comments: panda LR 6      Name: ROBBI POP      Apgar1: 8  Apgar5: 9   1A             1B                  Past Medical History:   Diagnosis Date    Abnormal Pap smear of cervix     Ascus, +hpv,2018    Anemia 2019    r/t GI bleeding    ASCUS with positive high risk HPV cervical     2018    Constipation     GI (gastrointestinal bleed) 10/2018-2019    resolved    History of chicken pox     History of shingles     HPV in female     UTI (urinary tract infection)        Past Surgical History:   Procedure Laterality Date    COLONOSCOPY  2019    chronic rectal inflamation    COLONOSCOPY W/ BIOPSIES  2019    friable (with spontaneous  bleeding) and granular mucosa in rectum NBIH, per path chronic proctitis    D & C WITH SUCTION N/A 2/29/2024    Procedure: SUCTION  DILATATION AND CURETTAGE;  Surgeon: Tye Barkley MD;  Location: [facility] MAIN OR;  Service: Obstetrics/Gynecology;  Laterality: N/A;    ENDOSCOPY N/A 2/4/2020    Procedure: ESOPHAGOGASTRODUODENOSCOPY with cold bx;  Surgeon: Mandy Florence MD;  Location: [facility] ENDOSCOPY;  Service: Gastroenterology;  Laterality: N/A;  pre - epigastric pain, n/vpost - gastritis    WISDOM TOOTH EXTRACTION         No current facility-administered medications on file prior to encounter.     Current Outpatient Medications on File Prior to Encounter   Medication Sig Dispense Refill    aspirin 81 MG EC tablet Take 1 tablet by mouth Daily. 60 tablet 2    famotidine (PEPCID) 10 MG tablet Take 1 tablet by mouth 2 (Two) Times a Day.      Prenatal Vit-Fe Fumarate-FA (Prenatal Vitamin) 27-0.8 MG tablet Take 1 capsule by mouth Daily. 30 tablet 11    fexofenadine (Allegra Allergy) 180 MG tablet 1 tablet Daily.      polyethylene glycol (MiraLax) 17 GM/SCOOP powder 17 g Daily.         Allergies   Allergen Reactions    Amoxicillin Itching    Macrobid [Nitrofurantoin Monohyd Macro] Itching    Sulfa Antibiotics Itching       Family History   Problem Relation Age of Onset    Multiple sclerosis Father     Atrial fibrillation Maternal Grandmother     Diabetes Maternal Grandfather     Colon polyps Maternal Uncle     Colon polyps Maternal Uncle     Irritable bowel syndrome Mother     Irritable bowel syndrome Maternal Aunt        Social History     Socioeconomic History    Marital status:    Tobacco Use    Smoking status: Former     Types: Electronic Cigarette    Smokeless tobacco: Current    Tobacco comments:     quit cigarettes 11/2017.  use vaporizer since   Vaping Use    Vaping status: Never Used   Substance and Sexual Activity    Alcohol use: Yes     Alcohol/week: 1.0 standard drink of alcohol     Types: 1 Glasses  "of wine per week     Comment: four beers per month    Drug use: No    Sexual activity: Yes     Partners: Male     Birth control/protection: Condom           Review of Systems   Constitutional:  Negative for chills, fatigue and fever.   HENT:  Negative for congestion, rhinorrhea and sore throat.    Eyes:  Negative for visual disturbance.   Respiratory: Negative.     Cardiovascular: Negative.    Gastrointestinal:  Negative for abdominal pain, constipation, diarrhea, nausea and vomiting.   Genitourinary:  Positive for vaginal discharge. Negative for difficulty urinating, dyspareunia, dysuria, flank pain, frequency, genital sores, hematuria, pelvic pain, urgency, vaginal bleeding and vaginal pain.   Neurological:  Negative for dizziness, seizures, light-headedness and headaches.   Psychiatric/Behavioral:  Negative for sleep disturbance. The patient is not nervous/anxious.           PHYSICAL EXAM:      VITAL SIGNS:  Vitals:    01/13/25 1100 01/13/25 1259   BP:  125/77   BP Location:  Right arm   Patient Position:  Lying   Pulse:  120   Resp:  16   Temp:  98 °F (36.7 °C)   TempSrc:  Oral   SpO2:  100%   Weight: 81.6 kg (180 lb)    Height:  162.6 cm (64\")        FHT'S:                   Baseline:  130 BPM  Variability:  Moderate = 6 - 25 BPM  Accelerations:  15 x 15 accelerations present     Decelerations:  absent  Contractions:   absent     Interpretation:    Reactive NST, CAT 1 tracing        PHYSICAL EXAM:    General: well developed; well nourished  no acute distress  mentation appropriate   Heart: Not performed.   Lungs  : breathing is unlabored     Abdomen: soft, non-tender; no masses  no umbilical or inguinal hernias are present  no hepato-splenomegaly       Cervix: was checked (by RN): 1 cm / 40 % / -2  Cervical Dilation (cm): 1  Cervical Effacement: 40  Fetal Station: -2  Cervical Consistency: soft  Cervical Position: mid-position   Contractions: none        Extremities: peripheral pulses normal, no pedal edema, no " "clubbing or cyanosis      LABS AND TESTING ORDERED:  Uterine and fetal monitoring  Urinalysis  ROM plus    LAB RESULTS:    No results found for this or any previous visit (from the past 24 hours).    Lab Results   Component Value Date    ABO O 2024    RH Positive 2024       Lab Results   Component Value Date    STREPGPB Positive (A) 2022                 Assessment & Plan     ASSESSMENT/PLAN:  Thomas Whitman is a 35 y.o. female  at 33w5d who presented with: vaginal discharge.  Work-up  negative for ROM.  She was reassured and discharged home.          Final Impression:  Pregnancy at 33w5d  Reactive NST.  CAT 1 tracing  Maternal vital signs were reviewed and were unremarkable              Vitals:    25 1100 25 1259   BP:  125/77   BP Location:  Right arm   Patient Position:  Lying   Pulse:  120   Resp:  16   Temp:  98 °F (36.7 °C)   TempSrc:  Oral   SpO2:  100%   Weight: 81.6 kg (180 lb)    Height:  162.6 cm (64\")       Lab Results   Component Value Date    STREPGPB Positive (A) 2022     Lab Results   Component Value Date    ABO O 2024    RH Positive 2024     COVID - 19 status unknown      PLAN:       I have spent 45 minutes including face to face time with the patient, greater than 50% in discussion of the diagnosis (counseling) and/or coordination of care.     Katharine Rivero MD  2025  13:10 EST  OB Hospitalist  Phone:  [phone]  "

## 2025-01-15 ENCOUNTER — ROUTINE PRENATAL (OUTPATIENT)
Dept: OBSTETRICS AND GYNECOLOGY | Age: 35
End: 2025-01-15
Payer: COMMERCIAL

## 2025-01-15 VITALS — WEIGHT: 179 LBS | SYSTOLIC BLOOD PRESSURE: 118 MMHG | BODY MASS INDEX: 30.73 KG/M2 | DIASTOLIC BLOOD PRESSURE: 64 MMHG

## 2025-01-15 DIAGNOSIS — K21.9 GASTROESOPHAGEAL REFLUX DISEASE WITHOUT ESOPHAGITIS: ICD-10-CM

## 2025-01-15 DIAGNOSIS — O09.529 ANTEPARTUM MULTIGRAVIDA OF ADVANCED MATERNAL AGE: ICD-10-CM

## 2025-01-15 DIAGNOSIS — Z34.83 PRENATAL CARE, SUBSEQUENT PREGNANCY, THIRD TRIMESTER: Primary | ICD-10-CM

## 2025-01-15 LAB
BACTERIA SPEC AEROBE CULT: NO GROWTH
CLARITY, POC: CLEAR
COLOR UR: YELLOW
GLUCOSE UR STRIP-MCNC: NEGATIVE MG/DL
PROT UR STRIP-MCNC: NEGATIVE MG/DL

## 2025-01-15 NOTE — PROGRESS NOTES
Chief Complaint   Patient presents with    Routine Prenatal Visit     HPI- Pt is 35 y.o.  at 34w0d here for prenatal visit.     OB History    Para Term  AB Living   3 1 1     1   SAB IAB Ectopic Molar Multiple Live Births           1 1      # Outcome Date GA Lbr Cortez/2nd Weight Sex Type Anes PTL Lv   3 Current            2 Term 22 40w5d 09:24 / 01:51 3300 g (7 lb 4.4 oz) M Vag-Spont EPI N PAWAN      Birth Comments: panda LR 6   1A             1B                  Wt Readings from Last 3 Encounters:   01/15/25 81.2 kg (179 lb)   25 81.6 kg (180 lb)   25 80.7 kg (178 lb)     Temp Readings from Last 3 Encounters:   25 98 °F (36.7 °C) (Oral)   24 97.2 °F (36.2 °C)   24 98.1 °F (36.7 °C)     BP Readings from Last 3 Encounters:   01/15/25 118/64   25 125/77   25 112/68     Pulse Readings from Last 3 Encounters:   25 101   24 88   24 67        Last OB US Data (since 2024)       None             ROS-     - No vaginal bleeding    GI- No abdominal pain    /64   Wt 81.2 kg (179 lb)   LMP 2024 (Approximate)   BMI 30.73 kg/m²   Exam - See flow sheet        Assessment-  Diagnoses and all orders for this visit:    1. Prenatal care, subsequent pregnancy, third trimester (Primary)  -     POC Urinalysis Dipstick    2. Antepartum multigravida of advanced maternal age    3. Gastroesophageal reflux disease without esophagitis       Patient feeling better taking her baby aspirin and Pepcid daily  Was seen in labor and delivery and rupture membranes were ruled out  She has not had any more discharge since that time  We will start weekly BPP's at next visit  We will check beta strep at next visit  Signs and symptoms of labor reviewed

## 2025-01-28 ENCOUNTER — ROUTINE PRENATAL (OUTPATIENT)
Dept: OBSTETRICS AND GYNECOLOGY | Age: 35
End: 2025-01-28
Payer: COMMERCIAL

## 2025-01-28 VITALS — DIASTOLIC BLOOD PRESSURE: 60 MMHG | BODY MASS INDEX: 31.07 KG/M2 | SYSTOLIC BLOOD PRESSURE: 102 MMHG | WEIGHT: 181 LBS

## 2025-01-28 DIAGNOSIS — Z34.83 PRENATAL CARE, SUBSEQUENT PREGNANCY, THIRD TRIMESTER: Primary | ICD-10-CM

## 2025-01-28 NOTE — PROGRESS NOTES
Chief Complaint   Patient presents with    Pregnancy Ultrasound     HPI- Pt is 35 y.o.  at 35w6d here for prenatal visit.     OB History    Para Term  AB Living   3 1 1     1   SAB IAB Ectopic Molar Multiple Live Births           1 1      # Outcome Date GA Lbr Cortez/2nd Weight Sex Type Anes PTL Lv   3 Current            2 Term 22 40w5d 09:24 / 01:51 3300 g (7 lb 4.4 oz) M Vag-Spont EPI N PAWAN      Birth Comments: panda LR 6   1A             1B                  Wt Readings from Last 3 Encounters:   25 82.1 kg (181 lb)   01/15/25 81.2 kg (179 lb)   25 81.6 kg (180 lb)     Temp Readings from Last 3 Encounters:   25 98 °F (36.7 °C) (Oral)   24 97.2 °F (36.2 °C)   24 98.1 °F (36.7 °C)     BP Readings from Last 3 Encounters:   25 102/60   01/15/25 118/64   25 125/77     Pulse Readings from Last 3 Encounters:   25 101   24 88   24 67        Last OB US Data (since 2024)       None             ROS-     - No vaginal bleeding    GI- No abdominal pain    /60   Wt 82.1 kg (181 lb)   LMP 2024 (Approximate)   BMI 31.07 kg/m²   Exam - See flow sheet        Assessment-  Diagnoses and all orders for this visit:    1. Prenatal care, subsequent pregnancy, third trimester (Primary)  -     POC Urinalysis Dipstick  -     Strep B Screen - Swab, Vaginal/Rectum    Patient reports very normal amount of fetal movement, BPP was 6 out of 8 there was some breathing but not for the entire needed length of time  We will check a nonstress test  Cervix fingertip 50% -2 vertex  GBS collected  If NST is reactive would like to repeat BPP tomorrow      Addendum-NST is quite reactive.  Good fetal movement noted  Will recheck BPP tomorrow and continue fetal kick counts

## 2025-01-29 ENCOUNTER — ROUTINE PRENATAL (OUTPATIENT)
Dept: OBSTETRICS AND GYNECOLOGY | Age: 35
End: 2025-01-29
Payer: COMMERCIAL

## 2025-01-29 VITALS — DIASTOLIC BLOOD PRESSURE: 62 MMHG | WEIGHT: 182 LBS | SYSTOLIC BLOOD PRESSURE: 104 MMHG | BODY MASS INDEX: 31.24 KG/M2

## 2025-01-29 DIAGNOSIS — Z3A.36 36 WEEKS GESTATION OF PREGNANCY: ICD-10-CM

## 2025-01-29 DIAGNOSIS — R12 HEARTBURN: ICD-10-CM

## 2025-01-29 DIAGNOSIS — O09.529 AMA (ADVANCED MATERNAL AGE) MULTIGRAVIDA 35+, UNSPECIFIED TRIMESTER: ICD-10-CM

## 2025-01-29 LAB
GLUCOSE UR STRIP-MCNC: NEGATIVE MG/DL
PROT UR STRIP-MCNC: NEGATIVE MG/DL

## 2025-01-29 NOTE — PROGRESS NOTES
Chief Complaint   Patient presents with    Routine Prenatal Visit     36 week ob  visit with bpp       HPI: 35 y.o.  at 36w0d gestation  Thomas was seen yesterday and had a BPP  It was 6/8-some breathing noted but not for the entire time  An NST was done that was very reactive  She is here for rpt BPP today  It was wnl, 8/8, HILDA 20 cm  She is doing well  Has no c/o  Has f/u next week  Will discuss IOL with Dr Barkley, ?    Vitals:    25 1253   BP: 104/62   Weight: 82.6 kg (182 lb)       ROS:  GI:  Negative  : na  Pulmonary: Negative     A/P  1. Intrauterine pregnancy at 36w0d   2. Pregnancy Risk:  HIGH RISK    Diagnoses and all orders for this visit:    1. AMA (advanced maternal age) multigravida 35+, unspecified trimester    2. Heartburn    3. 36 weeks gestation of pregnancy  -     POC Urinalysis Dipstick, Multipro        -----------------------  PLAN:   No follow-ups on file.      VIKTORIA Morris  2025 13:26 EST

## 2025-01-30 LAB — GP B STREP DNA SPEC QL NAA+PROBE: POSITIVE

## 2025-02-04 ENCOUNTER — ROUTINE PRENATAL (OUTPATIENT)
Dept: OBSTETRICS AND GYNECOLOGY | Age: 35
End: 2025-02-04
Payer: COMMERCIAL

## 2025-02-04 VITALS — SYSTOLIC BLOOD PRESSURE: 118 MMHG | BODY MASS INDEX: 31.58 KG/M2 | DIASTOLIC BLOOD PRESSURE: 62 MMHG | WEIGHT: 184 LBS

## 2025-02-04 DIAGNOSIS — Z34.83 PRENATAL CARE, SUBSEQUENT PREGNANCY, THIRD TRIMESTER: Primary | ICD-10-CM

## 2025-02-04 DIAGNOSIS — O09.529 ANTEPARTUM MULTIGRAVIDA OF ADVANCED MATERNAL AGE: ICD-10-CM

## 2025-02-04 NOTE — PROGRESS NOTES
Chief Complaint   Patient presents with    Pregnancy Ultrasound     BPP     HPI- Pt is 35 y.o.  at 36w6d here for prenatal visit.     OB History    Para Term  AB Living   3 1 1     1   SAB IAB Ectopic Molar Multiple Live Births           1 1      # Outcome Date GA Lbr Cortez/2nd Weight Sex Type Anes PTL Lv   3 Current            2 Term 22 40w5d 09:24 / 01:51 3300 g (7 lb 4.4 oz) M Vag-Spont EPI N PAWAN      Birth Comments: panda LR 6   1A             1B                  Wt Readings from Last 3 Encounters:   25 83.5 kg (184 lb)   25 82.6 kg (182 lb)   25 82.1 kg (181 lb)     Temp Readings from Last 3 Encounters:   25 98 °F (36.7 °C) (Oral)   24 97.2 °F (36.2 °C)   24 98.1 °F (36.7 °C)     BP Readings from Last 3 Encounters:   25 118/62   25 104/62   25 102/60     Pulse Readings from Last 3 Encounters:   25 101   24 88   24 67        Last OB US Data (since 2024)         Value Time User    Placenta location  Anterior 2025 10:05 AM Tye Barkley MD             ROS-     - No vaginal bleeding    GI- No abdominal pain    /62   Wt 83.5 kg (184 lb)   LMP 2024 (Approximate)   BMI 31.58 kg/m²   Exam - See flow sheet        Assessment-  Diagnoses and all orders for this visit:    1. Prenatal care, subsequent pregnancy, third trimester (Primary)  -     POC Urinalysis Dipstick    Advanced maternal age  Positive GBS  Occasional contractions, reports good fetal movement, no cervical change  Continue weekly BPP's today's was 8 out of 8  M IL recommended at 39 weeks, patient agrees we will schedule

## 2025-02-11 ENCOUNTER — ROUTINE PRENATAL (OUTPATIENT)
Dept: OBSTETRICS AND GYNECOLOGY | Age: 35
End: 2025-02-11
Payer: COMMERCIAL

## 2025-02-11 VITALS — BODY MASS INDEX: 31.58 KG/M2 | DIASTOLIC BLOOD PRESSURE: 72 MMHG | WEIGHT: 184 LBS | SYSTOLIC BLOOD PRESSURE: 116 MMHG

## 2025-02-11 DIAGNOSIS — O09.529 ANTEPARTUM MULTIGRAVIDA OF ADVANCED MATERNAL AGE: ICD-10-CM

## 2025-02-11 DIAGNOSIS — N89.8 VAGINAL ITCHING: ICD-10-CM

## 2025-02-11 DIAGNOSIS — B95.1 POSITIVE GBS TEST: ICD-10-CM

## 2025-02-11 DIAGNOSIS — Z13.89 SCREENING FOR BLOOD OR PROTEIN IN URINE: Primary | ICD-10-CM

## 2025-02-11 LAB
BILIRUB BLD-MCNC: NEGATIVE MG/DL
CLARITY, POC: CLEAR
COLOR UR: YELLOW
GLUCOSE UR STRIP-MCNC: NEGATIVE MG/DL
KETONES UR QL: NEGATIVE
LEUKOCYTE EST, POC: ABNORMAL
NITRITE UR-MCNC: NEGATIVE MG/ML
PH UR: 5.5 [PH] (ref 5–8)
PROT UR STRIP-MCNC: NEGATIVE MG/DL
RBC # UR STRIP: NEGATIVE /UL
SP GR UR: 1.01 (ref 1–1.03)
UROBILINOGEN UR QL: NORMAL

## 2025-02-11 NOTE — PROGRESS NOTES
Chief Complaint   Patient presents with    Routine Prenatal Visit     OB check, US today 37w6d, GBS+  C/O vaginal itching, for about 1 week off and on       HPI: 35 y.o.  at 37w6d gestation  She is doing well  Notes good FM  Had her BPP today which was wnl and reassuring    HILDA 16 cm  Good FHT, 122 bpm  Notes vaginal itching  Only occasional and notes it in the morning  Denies any new d/c  Swab obtained, will send and wait on results prior to treating  Nitrazine negative  Can try topical emollient in meantime to protect tissue  Keep f/u appt as scheduled  Call for any issues  Vitals:    25 1519   BP: 116/72   Weight: 83.5 kg (184 lb)       ROS:  GI:  Negative  : na  Pulmonary: Negative     A/P  1. Intrauterine pregnancy at 37w6d   2. Pregnancy Risk:  HIGH RISK    Diagnoses and all orders for this visit:    1. Screening for blood or protein in urine (Primary)  -     POC Urinalysis Dipstick    2. Antepartum multigravida of advanced maternal age    3. Vaginal itching  -     NuSwab BV & Candida - Swab, Cervix    4. Positive GBS test        -----------------------  PLAN:   Return for as scheduled .      VIKTORIA Morris  2025 15:59 EST

## 2025-02-13 LAB
A VAGINAE DNA VAG QL NAA+PROBE: ABNORMAL SCORE
BVAB2 DNA VAG QL NAA+PROBE: ABNORMAL SCORE
C ALBICANS DNA VAG QL NAA+PROBE: POSITIVE
C GLABRATA DNA VAG QL NAA+PROBE: NEGATIVE
MEGA1 DNA VAG QL NAA+PROBE: ABNORMAL SCORE

## 2025-02-13 RX ORDER — CLOTRIMAZOLE 1 %
CREAM WITH APPLICATOR VAGINAL NIGHTLY
Qty: 45 G | Refills: 0 | Status: SHIPPED | OUTPATIENT
Start: 2025-02-13 | End: 2025-02-20

## 2025-02-18 ENCOUNTER — ROUTINE PRENATAL (OUTPATIENT)
Dept: OBSTETRICS AND GYNECOLOGY | Age: 35
End: 2025-02-18
Payer: COMMERCIAL

## 2025-02-18 VITALS — SYSTOLIC BLOOD PRESSURE: 112 MMHG | DIASTOLIC BLOOD PRESSURE: 64 MMHG | BODY MASS INDEX: 31.93 KG/M2 | WEIGHT: 186 LBS

## 2025-02-18 DIAGNOSIS — O99.820 GBS (GROUP B STREPTOCOCCUS CARRIER), +RV CULTURE, CURRENTLY PREGNANT: ICD-10-CM

## 2025-02-18 DIAGNOSIS — Z13.89 SCREENING FOR BLOOD OR PROTEIN IN URINE: Primary | ICD-10-CM

## 2025-02-18 DIAGNOSIS — O09.529 ANTEPARTUM MULTIGRAVIDA OF ADVANCED MATERNAL AGE: ICD-10-CM

## 2025-02-18 LAB
GLUCOSE UR STRIP-MCNC: NEGATIVE MG/DL
PROT UR STRIP-MCNC: NEGATIVE MG/DL

## 2025-02-22 ENCOUNTER — ANESTHESIA EVENT (OUTPATIENT)
Dept: LABOR AND DELIVERY | Facility: HOSPITAL | Age: 35
End: 2025-02-22
Payer: COMMERCIAL

## 2025-02-22 ENCOUNTER — HOSPITAL ENCOUNTER (INPATIENT)
Facility: HOSPITAL | Age: 35
LOS: 1 days | Discharge: HOME | End: 2025-02-23
Attending: OBSTETRICS & GYNECOLOGY | Admitting: OBSTETRICS & GYNECOLOGY
Payer: COMMERCIAL

## 2025-02-22 ENCOUNTER — ANESTHESIA (OUTPATIENT)
Dept: LABOR AND DELIVERY | Facility: HOSPITAL | Age: 35
End: 2025-02-22
Payer: COMMERCIAL

## 2025-02-22 PROBLEM — Z37.9 NORMAL LABOR: Status: ACTIVE | Noted: 2025-02-22

## 2025-02-22 LAB
ABO GROUP BLD: NORMAL
BLD GP AB SCN SERPL QL: NEGATIVE
DEPRECATED RDW RBC AUTO: 37.4 FL (ref 37–54)
ERYTHROCYTE [DISTWIDTH] IN BLOOD BY AUTOMATED COUNT: 11.7 % (ref 12.3–15.4)
HCT VFR BLD AUTO: 34.5 % (ref 34–46.6)
HGB BLD-MCNC: 11.6 G/DL (ref 12–15.9)
MCH RBC QN AUTO: 29.4 PG (ref 26.6–33)
MCHC RBC AUTO-ENTMCNC: 33.6 G/DL (ref 31.5–35.7)
MCV RBC AUTO: 87.3 FL (ref 79–97)
PLATELET # BLD AUTO: 179 10*3/MM3 (ref 140–450)
PMV BLD AUTO: 10.7 FL (ref 6–12)
RBC # BLD AUTO: 3.95 10*6/MM3 (ref 3.77–5.28)
RH BLD: POSITIVE
T&S EXPIRATION DATE: NORMAL
TREPONEMA PALLIDUM IGG+IGM AB [PRESENCE] IN SERUM OR PLASMA BY IMMUNOASSAY: NORMAL
WBC NRBC COR # BLD AUTO: 9.64 10*3/MM3 (ref 3.4–10.8)

## 2025-02-22 PROCEDURE — 86900 BLOOD TYPING SEROLOGIC ABO: CPT | Performed by: OBSTETRICS & GYNECOLOGY

## 2025-02-22 PROCEDURE — 86901 BLOOD TYPING SEROLOGIC RH(D): CPT | Performed by: OBSTETRICS & GYNECOLOGY

## 2025-02-22 PROCEDURE — 25810000003 LACTATED RINGERS SOLUTION: Performed by: OBSTETRICS & GYNECOLOGY

## 2025-02-22 PROCEDURE — 0KQM0ZZ REPAIR PERINEUM MUSCLE, OPEN APPROACH: ICD-10-PCS | Performed by: OBSTETRICS & GYNECOLOGY

## 2025-02-22 PROCEDURE — 25810000003 LACTATED RINGERS PER 1000 ML: Performed by: OBSTETRICS & GYNECOLOGY

## 2025-02-22 PROCEDURE — 25010000002 CEFAZOLIN PER 500 MG: Performed by: OBSTETRICS & GYNECOLOGY

## 2025-02-22 PROCEDURE — 10907ZC DRAINAGE OF AMNIOTIC FLUID, THERAPEUTIC FROM PRODUCTS OF CONCEPTION, VIA NATURAL OR ARTIFICIAL OPENING: ICD-10-PCS | Performed by: OBSTETRICS & GYNECOLOGY

## 2025-02-22 PROCEDURE — 0UQMXZZ REPAIR VULVA, EXTERNAL APPROACH: ICD-10-PCS | Performed by: OBSTETRICS & GYNECOLOGY

## 2025-02-22 PROCEDURE — 59400 OBSTETRICAL CARE: CPT | Performed by: OBSTETRICS & GYNECOLOGY

## 2025-02-22 PROCEDURE — C1755 CATHETER, INTRASPINAL: HCPCS | Performed by: ANESTHESIOLOGY

## 2025-02-22 PROCEDURE — 85027 COMPLETE CBC AUTOMATED: CPT | Performed by: OBSTETRICS & GYNECOLOGY

## 2025-02-22 PROCEDURE — 86850 RBC ANTIBODY SCREEN: CPT | Performed by: OBSTETRICS & GYNECOLOGY

## 2025-02-22 PROCEDURE — 99202 OFFICE O/P NEW SF 15 MIN: CPT | Performed by: OBSTETRICS & GYNECOLOGY

## 2025-02-22 PROCEDURE — 25010000002 LIDOCAINE-EPINEPHRINE (PF) 2 %-1:200000 SOLUTION: Performed by: ANESTHESIOLOGY

## 2025-02-22 PROCEDURE — 86780 TREPONEMA PALLIDUM: CPT | Performed by: OBSTETRICS & GYNECOLOGY

## 2025-02-22 RX ORDER — SODIUM CHLORIDE 9 MG/ML
40 INJECTION, SOLUTION INTRAVENOUS AS NEEDED
Status: DISCONTINUED | OUTPATIENT
Start: 2025-02-22 | End: 2025-02-22 | Stop reason: HOSPADM

## 2025-02-22 RX ORDER — EPHEDRINE SULFATE 50 MG/ML
5 INJECTION, SOLUTION INTRAVENOUS
Status: DISCONTINUED | OUTPATIENT
Start: 2025-02-22 | End: 2025-02-22 | Stop reason: HOSPADM

## 2025-02-22 RX ORDER — METHYLERGONOVINE MALEATE 0.2 MG/ML
200 INJECTION INTRAVENOUS ONCE AS NEEDED
Status: DISCONTINUED | OUTPATIENT
Start: 2025-02-22 | End: 2025-02-23 | Stop reason: HOSPADM

## 2025-02-22 RX ORDER — TRANEXAMIC ACID 10 MG/ML
1000 INJECTION, SOLUTION INTRAVENOUS ONCE AS NEEDED
Status: DISCONTINUED | OUTPATIENT
Start: 2025-02-22 | End: 2025-02-23 | Stop reason: HOSPADM

## 2025-02-22 RX ORDER — FENTANYL/ROPIVACAINE/NS/PF 2MCG/ML-.2
10 PLASTIC BAG, INJECTION (ML) INJECTION CONTINUOUS
Status: DISCONTINUED | OUTPATIENT
Start: 2025-02-22 | End: 2025-02-22

## 2025-02-22 RX ORDER — HYDROCODONE BITARTRATE AND ACETAMINOPHEN 10; 325 MG/1; MG/1
1 TABLET ORAL EVERY 4 HOURS PRN
Status: DISCONTINUED | OUTPATIENT
Start: 2025-02-22 | End: 2025-02-23 | Stop reason: HOSPADM

## 2025-02-22 RX ORDER — SODIUM CHLORIDE 0.9 % (FLUSH) 0.9 %
10 SYRINGE (ML) INJECTION EVERY 12 HOURS SCHEDULED
Status: DISCONTINUED | OUTPATIENT
Start: 2025-02-22 | End: 2025-02-22 | Stop reason: HOSPADM

## 2025-02-22 RX ORDER — CARBOPROST TROMETHAMINE 250 UG/ML
250 INJECTION, SOLUTION INTRAMUSCULAR
Status: DISCONTINUED | OUTPATIENT
Start: 2025-02-22 | End: 2025-02-22 | Stop reason: HOSPADM

## 2025-02-22 RX ORDER — HYDROCORTISONE 25 MG/G
1 CREAM TOPICAL AS NEEDED
Status: DISCONTINUED | OUTPATIENT
Start: 2025-02-22 | End: 2025-02-23 | Stop reason: HOSPADM

## 2025-02-22 RX ORDER — DOCUSATE SODIUM 100 MG/1
100 CAPSULE, LIQUID FILLED ORAL 2 TIMES DAILY
Status: DISCONTINUED | OUTPATIENT
Start: 2025-02-22 | End: 2025-02-23 | Stop reason: HOSPADM

## 2025-02-22 RX ORDER — SODIUM CHLORIDE 0.9 % (FLUSH) 0.9 %
1-10 SYRINGE (ML) INJECTION AS NEEDED
Status: DISCONTINUED | OUTPATIENT
Start: 2025-02-22 | End: 2025-02-23 | Stop reason: HOSPADM

## 2025-02-22 RX ORDER — ONDANSETRON 2 MG/ML
4 INJECTION INTRAMUSCULAR; INTRAVENOUS ONCE AS NEEDED
Status: DISCONTINUED | OUTPATIENT
Start: 2025-02-22 | End: 2025-02-22 | Stop reason: HOSPADM

## 2025-02-22 RX ORDER — HYDROCODONE BITARTRATE AND ACETAMINOPHEN 5; 325 MG/1; MG/1
1 TABLET ORAL EVERY 4 HOURS PRN
Status: DISCONTINUED | OUTPATIENT
Start: 2025-02-22 | End: 2025-02-23 | Stop reason: HOSPADM

## 2025-02-22 RX ORDER — ACETAMINOPHEN 325 MG/1
650 TABLET ORAL EVERY 6 HOURS PRN
Status: DISCONTINUED | OUTPATIENT
Start: 2025-02-22 | End: 2025-02-23 | Stop reason: HOSPADM

## 2025-02-22 RX ORDER — IBUPROFEN 600 MG/1
600 TABLET, FILM COATED ORAL EVERY 6 HOURS PRN
Status: DISCONTINUED | OUTPATIENT
Start: 2025-02-22 | End: 2025-02-23 | Stop reason: HOSPADM

## 2025-02-22 RX ORDER — OXYTOCIN/0.9 % SODIUM CHLORIDE 30/500 ML
125 PLASTIC BAG, INJECTION (ML) INTRAVENOUS ONCE AS NEEDED
OUTPATIENT
Start: 2025-02-22

## 2025-02-22 RX ORDER — LIDOCAINE HYDROCHLORIDE 10 MG/ML
0.5 INJECTION, SOLUTION INFILTRATION; PERINEURAL ONCE AS NEEDED
Status: DISCONTINUED | OUTPATIENT
Start: 2025-02-22 | End: 2025-02-22 | Stop reason: HOSPADM

## 2025-02-22 RX ORDER — SODIUM CHLORIDE 0.9 % (FLUSH) 0.9 %
10 SYRINGE (ML) INJECTION AS NEEDED
Status: DISCONTINUED | OUTPATIENT
Start: 2025-02-22 | End: 2025-02-22 | Stop reason: HOSPADM

## 2025-02-22 RX ORDER — MAGNESIUM CARB/ALUMINUM HYDROX 105-160MG
30 TABLET,CHEWABLE ORAL ONCE
Status: DISCONTINUED | OUTPATIENT
Start: 2025-02-22 | End: 2025-02-22 | Stop reason: HOSPADM

## 2025-02-22 RX ORDER — PHYTONADIONE 1 MG/.5ML
INJECTION, EMULSION INTRAMUSCULAR; INTRAVENOUS; SUBCUTANEOUS
Status: ACTIVE
Start: 2025-02-22 | End: 2025-02-23

## 2025-02-22 RX ORDER — ERYTHROMYCIN 5 MG/G
OINTMENT OPHTHALMIC
Status: ACTIVE
Start: 2025-02-22 | End: 2025-02-23

## 2025-02-22 RX ORDER — OXYTOCIN/0.9 % SODIUM CHLORIDE 30/500 ML
999 PLASTIC BAG, INJECTION (ML) INTRAVENOUS ONCE
Status: COMPLETED | OUTPATIENT
Start: 2025-02-22 | End: 2025-02-22

## 2025-02-22 RX ORDER — DIPHENHYDRAMINE HYDROCHLORIDE 50 MG/ML
12.5 INJECTION INTRAMUSCULAR; INTRAVENOUS EVERY 8 HOURS PRN
Status: DISCONTINUED | OUTPATIENT
Start: 2025-02-22 | End: 2025-02-22 | Stop reason: HOSPADM

## 2025-02-22 RX ORDER — BISACODYL 10 MG
10 SUPPOSITORY, RECTAL RECTAL DAILY PRN
Status: DISCONTINUED | OUTPATIENT
Start: 2025-02-23 | End: 2025-02-23 | Stop reason: HOSPADM

## 2025-02-22 RX ORDER — FAMOTIDINE 10 MG/ML
20 INJECTION, SOLUTION INTRAVENOUS ONCE AS NEEDED
Status: COMPLETED | OUTPATIENT
Start: 2025-02-22 | End: 2025-02-22

## 2025-02-22 RX ORDER — SODIUM CHLORIDE, SODIUM LACTATE, POTASSIUM CHLORIDE, CALCIUM CHLORIDE 600; 310; 30; 20 MG/100ML; MG/100ML; MG/100ML; MG/100ML
125 INJECTION, SOLUTION INTRAVENOUS CONTINUOUS
Status: DISCONTINUED | OUTPATIENT
Start: 2025-02-22 | End: 2025-02-22

## 2025-02-22 RX ORDER — MISOPROSTOL 200 UG/1
800 TABLET ORAL ONCE AS NEEDED
Status: DISCONTINUED | OUTPATIENT
Start: 2025-02-22 | End: 2025-02-22 | Stop reason: HOSPADM

## 2025-02-22 RX ORDER — MISOPROSTOL 200 UG/1
600 TABLET ORAL ONCE AS NEEDED
Status: DISCONTINUED | OUTPATIENT
Start: 2025-02-22 | End: 2025-02-23 | Stop reason: HOSPADM

## 2025-02-22 RX ORDER — METHYLERGONOVINE MALEATE 0.2 MG/ML
200 INJECTION INTRAVENOUS ONCE AS NEEDED
Status: DISCONTINUED | OUTPATIENT
Start: 2025-02-22 | End: 2025-02-22 | Stop reason: HOSPADM

## 2025-02-22 RX ORDER — ACETAMINOPHEN 325 MG/1
650 TABLET ORAL EVERY 4 HOURS PRN
Status: DISCONTINUED | OUTPATIENT
Start: 2025-02-22 | End: 2025-02-22 | Stop reason: HOSPADM

## 2025-02-22 RX ORDER — OXYTOCIN/0.9 % SODIUM CHLORIDE 30/500 ML
250 PLASTIC BAG, INJECTION (ML) INTRAVENOUS CONTINUOUS
Status: DISPENSED | OUTPATIENT
Start: 2025-02-22 | End: 2025-02-22

## 2025-02-22 RX ORDER — OXYTOCIN/0.9 % SODIUM CHLORIDE 30/500 ML
125 PLASTIC BAG, INJECTION (ML) INTRAVENOUS ONCE AS NEEDED
Status: DISCONTINUED | OUTPATIENT
Start: 2025-02-22 | End: 2025-02-23 | Stop reason: HOSPADM

## 2025-02-22 RX ORDER — LIDOCAINE HCL/EPINEPHRINE/PF 2%-1:200K
VIAL (ML) INJECTION AS NEEDED
Status: DISCONTINUED | OUTPATIENT
Start: 2025-02-22 | End: 2025-02-22 | Stop reason: SURG

## 2025-02-22 RX ADMIN — SODIUM CHLORIDE, POTASSIUM CHLORIDE, SODIUM LACTATE AND CALCIUM CHLORIDE 125 ML/HR: 600; 310; 30; 20 INJECTION, SOLUTION INTRAVENOUS at 10:18

## 2025-02-22 RX ADMIN — DOCUSATE SODIUM 100 MG: 100 CAPSULE, LIQUID FILLED ORAL at 20:51

## 2025-02-22 RX ADMIN — SODIUM CHLORIDE, POTASSIUM CHLORIDE, SODIUM LACTATE AND CALCIUM CHLORIDE 1000 ML: 600; 310; 30; 20 INJECTION, SOLUTION INTRAVENOUS at 08:54

## 2025-02-22 RX ADMIN — LIDOCAINE HYDROCHLORIDE AND EPINEPHRINE 3 ML: 20; 5 INJECTION, SOLUTION EPIDURAL; INFILTRATION; INTRACAUDAL; PERINEURAL at 09:55

## 2025-02-22 RX ADMIN — FAMOTIDINE 20 MG: 10 INJECTION INTRAVENOUS at 11:03

## 2025-02-22 RX ADMIN — Medication 250 ML/HR: at 14:57

## 2025-02-22 RX ADMIN — Medication 10 ML/HR: at 10:00

## 2025-02-22 RX ADMIN — Medication 125 ML/HR: at 16:10

## 2025-02-22 RX ADMIN — IBUPROFEN 600 MG: 600 TABLET, FILM COATED ORAL at 16:12

## 2025-02-22 RX ADMIN — HYDROCORTISONE 1 APPLICATION: 25 CREAM TOPICAL at 20:51

## 2025-02-22 RX ADMIN — ACETAMINOPHEN 650 MG: 325 TABLET, FILM COATED ORAL at 20:51

## 2025-02-22 RX ADMIN — Medication 999 ML/HR: at 14:40

## 2025-02-22 RX ADMIN — Medication: at 20:52

## 2025-02-22 RX ADMIN — CEFAZOLIN 2000 MG: 2 INJECTION, POWDER, FOR SOLUTION INTRAMUSCULAR; INTRAVENOUS at 08:54

## 2025-02-22 NOTE — H&P
[facility]  Obstetric History and Physical    Chief Complaint   Patient presents with    Contractions     Irregular contractions through the night. Regular contractions starting at 0500 every 2-3 minutes       Subjective     Patient is a 35 y.o. female  currently at 39w3d, who presents with labor. She denies vag bleeding or leaking fluid. She is feeling fetal movement.     Her prenatal care is complicated by   Patient Active Problem List   Diagnosis    HPV in female    Nausea    Gastroesophageal reflux disease without esophagitis    Antepartum multigravida of advanced maternal age    GBS (group B Streptococcus carrier), +RV culture, currently pregnant    Normal labor    .  Her previous obstetric/gynecological history is noted for prior term vaginal delivery.    The following portions of the patients history were reviewed and updated as appropriate: current medications, allergies, past medical history, past surgical history, past family history, past social history, and problem list .       Prenatal Information:  Prenatal Results       Initial Prenatal Labs       Test Value Reference Range Date Time    Hemoglobin  13.7 g/dL 11.1 - 15.9 24 1155    Hematocrit  40.8 % 34.0 - 46.6 24 1155    Platelets  203 x10E3/uL 150 - 450 24 1155    Rubella IgG  1.58 index Immune >0.99 24 1155    Hepatitis B SAg  Negative  Negative 24 1155    Hepatitis C Ab  Non Reactive  Non Reactive 24 1155    RPR  Non Reactive  Non Reactive 24 1255       Non Reactive  Non Reactive 24 1155    T. Pallidum Ab         ABO  O   24 1155    Rh  Positive   24 1155    Antibody Screen  Negative  Negative 24 1155    HIV  Non Reactive  Non Reactive 24 1155    Urine Culture  No growth   25 1301       Final report   24        Final report   24     Gonorrhea        Chlamydia        TSH        HgB A1c         Varicella IgG        Hemoglobinopathy Fractionation         Hemoglobinopathy (genetic testing)        Cystic fibrosis   Negative   08/07/24 1002    Spinal muscular atrophy  Negative   08/07/24 1002    Fragile X                  Fetal testing        Test Value Reference Range Date Time    NIPT        MSAFP  *Screen Negative*   09/04/24 1543    AFP-4                  2nd and 3rd Trimester       Test Value Reference Range Date Time    Hemoglobin (repeated)  12.3 g/dL 11.1 - 15.9 11/27/24 1255    Hematocrit (repeated)        Platelets   203 x10E3/uL 150 - 450 06/24/24 1155    1 hour GTT   124 mg/dL 70 - 139 11/27/24 1255    Antibody Screen (repeated)        3rd TM syphilis scrn (repeated)  RPR   Non Reactive  Non Reactive 11/27/24 1255    3rd TM syphilis scrn (repeated) TP-Ab        3rd TM syphilis screen TB-Ab (FTA)        Syphilis cascade test TP-Ab (EIA)        Syphilis cascade TPPA        GTT Fasting        GTT 1 Hr        GTT 2 Hr        GTT 3 Hr        Group B Strep  Positive  Negative 01/28/25               Other testing        Test Value Reference Range Date Time    Parvo IgG         CMV IgG                   Drug Screening       Test Value Reference Range Date Time    Amphetamine Screen        Barbiturate Screen        Benzodiazepine Screen        Methadone Screen        Phencyclidine Screen        Opiates Screen        THC Screen        Cocaine Screen        Propoxyphene Screen        Buprenorphine Screen        Methamphetamine Screen        Oxycodone Screen        Tricyclic Antidepressants Screen                  Legend    ^: Historical                          External Prenatal Results       Pregnancy Outside Results - Transcribed From Office Records - See Scanned Records For Details       Test Value Date Time    ABO  O  06/24/24 1155    Rh  Positive  06/24/24 1155    Antibody Screen  Negative  06/24/24 1155    Varicella IgG       Rubella  1.58 index 06/24/24 1155    Hgb  12.3 g/dL 11/27/24 1255       13.7 g/dL 06/24/24 1155    Hct  40.8 % 06/24/24 1155    HgB A1c         1h GTT  124 mg/dL 24 1255    3h GTT Fasting       3h GTT 1 hour       3h GTT 2 hour       3h GTT 3 hour        Gonorrhea (discrete)       Chlamydia (discrete)       RPR  Non Reactive  24 1255       Non Reactive  24 1155    Syphils cascade: TP-Ab (FTA)       TP-Ab       TP-Ab (EIA)       TPPA       HBsAg  Negative  24 1155    Herpes Simplex Virus PCR       Herpes Simplex VIrus Culture       HIV  Non Reactive  24 1155    Hep C RNA Quant PCR       Hep C Antibody  Non Reactive  24 1155    AFP  23.9 ng/mL 24 1543    NIPT       Cystic Fibrosis (Aleshia)  Negative  24 1002    Cystic Fibroisis        Spinal Muscular atrophy  Negative  24 1002    Fragile X       Group B Strep  Positive  25     GBS Susceptibility to Clindamycin       GBS Susceptibility to Erythromycin       Fetal Fibronectin       Genetic Testing, Maternal Blood                 Drug Screening       Test Value Date Time    Urine Drug Screen       Amphetamine Screen       Barbiturate Screen       Benzodiazepine Screen       Methadone Screen       Phencyclidine Screen       Opiates Screen       THC Screen       Cocaine Screen       Propoxyphene Screen       Buprenorphine Screen       Methamphetamine Screen       Oxycodone Screen       Tricyclic Antidepressants Screen                 Legend    ^: Historical                             Past OB History:     OB History    Para Term  AB Living   3 1 1 0 0 1   SAB IAB Ectopic Molar Multiple Live Births   0 0 0 0 1 1      # Outcome Date GA Lbr Cortez/2nd Weight Sex Type Anes PTL Lv   3 Current            2 Term 22 40w5d 09:24 :51 3300 g (7 lb 4.4 oz) M Vag-Spont EPI N PAWAN      Birth Comments: panda LR 6      Name: ROBBI POP      Apgar1: 8  Apgar5: 9   1A             1B                 Past Medical History: Past Medical History:   Diagnosis Date    Abnormal Pap smear of cervix     Ascus, +hpv,2018    Anemia  01/2019    r/t GI bleeding    ASCUS with positive high risk HPV cervical     7/2018    Constipation     GI (gastrointestinal bleed) 10/2018-1/2019    resolved    History of chicken pox     History of shingles     HPV in female     UTI (urinary tract infection)       Past Surgical History Past Surgical History:   Procedure Laterality Date    COLONOSCOPY  Jan 2019    chronic rectal inflamation    COLONOSCOPY W/ BIOPSIES  01/25/2019    friable (with spontaneous bleeding) and granular mucosa in rectum NBIH, per path chronic proctitis    D & C WITH SUCTION N/A 2/29/2024    Procedure: SUCTION  DILATATION AND CURETTAGE;  Surgeon: Tye Barkley MD;  Location: [facility] MAIN OR;  Service: Obstetrics/Gynecology;  Laterality: N/A;    ENDOSCOPY N/A 2/4/2020    Procedure: ESOPHAGOGASTRODUODENOSCOPY with cold bx;  Surgeon: Mandy Florence MD;  Location: [facility] ENDOSCOPY;  Service: Gastroenterology;  Laterality: N/A;  pre - epigastric pain, n/vpost - gastritis    WISDOM TOOTH EXTRACTION        Family History: Family History   Problem Relation Age of Onset    Multiple sclerosis Father     Atrial fibrillation Maternal Grandmother     Diabetes Maternal Grandfather     Colon polyps Maternal Uncle     Colon polyps Maternal Uncle     Irritable bowel syndrome Mother     Irritable bowel syndrome Maternal Aunt       Social History:  reports that she has quit smoking. Her smoking use included electronic cigarette. She uses smokeless tobacco.   reports that she does not currently use alcohol after a past usage of about 1.0 standard drink of alcohol per week.   reports no history of drug use.        General ROS: Pertinent items are noted in HPI    Objective       Vital Signs Range for the last 24 hours  Temperature: Temp:  [98 °F (36.7 °C)] 98 °F (36.7 °C)   Temp Source: Temp src: Oral   BP: BP: (104)/(92) 104/92   Pulse: Heart Rate:  [97] 97   Respirations: Resp:  [16] 16   SPO2: SpO2:  [99 %] 99 %   O2 Amount (l/min):     O2 Devices      Weight: Weight:  [85.3 kg (188 lb)] 85.3 kg (188 lb)     Physical Examination: General appearance - alert, well appearing, and in no distress  Mental status - alert, oriented to person, place, and time  Chest - clear to auscultation, no wheezes, rales or rhonchi, symmetric air entry  Heart - normal rate and regular rhythm  Abdomen - soft, gravid, nontender  Neurological - alert, oriented, normal speech, no focal findings or movement disorder noted  Extremities - bilateral lower ext with trace edema; no cords or tenderness    Presentation:    Cervix: Exam by: Method: sterile vaginal exam performed   Dilation: Cervical Dilation (cm): 4   Effacement: Cervical Effacement: 90   Station:         Fetal Heart Rate Assessment     Reactive    Uterine Assessment    Q 4 min      Assessment & Plan       Normal labor        Assessment:  1.  Intrauterine pregnancy at 39w3d gestation with reactive fetal status.    2.  labor  without ROM  3.  Obstetrical history significant for  prior term vag delivery .  4.  GBS status:   Strep Gp B LINUS   Date Value Ref Range Status   2025 Positive (A) Negative Final     Comment:     Centers for Disease Control and Prevention (CDC) and American Congress  of Obstetricians and Gynecologists (ACOG) guidelines for prevention of   group B streptococcal (GBS) disease specify co-collection of  a vaginal and rectal swab specimen to maximize sensitivity of GBS  detection. Per the CDC and ACOG, swabbing both the lower vagina and  rectum substantially increases the yield of detection compared with  sampling the vagina alone.  Penicillin G, ampicillin, or cefazolin are indicated for intrapartum  prophylaxis of  GBS colonization. Reflex susceptibility  testing should be performed prior to use of clindamycin only on GBS  isolates from penicillin-allergic women who are considered a high risk  for anaphylaxis. Treatment with vancomycin without additional testing  is warranted if  resistance to clindamycin is noted.         Plan:  1. fetal and uterine monitoring  continuously, expectant management, analgesia with  epidural, and antibiotic for GBS  2. Plan of care has been reviewed with patient and partner  3.  Risks, benefits of treatment plan have been discussed.  4.  All questions have been answered.        Anahi Cobian MD  2/22/2025  09:53 EST

## 2025-02-22 NOTE — ANESTHESIA PREPROCEDURE EVALUATION
Anesthesia Evaluation     Patient summary reviewed and Nursing notes reviewed   NPO Solid Status: > 8 hours             Airway   Mallampati: I  TM distance: >3 FB  Neck ROM: full  No difficulty expected  Dental - normal exam     Pulmonary - negative pulmonary ROS   Cardiovascular - negative cardio ROS  Exercise tolerance: good (4-7 METS)    Rhythm: regular        Neuro/Psych- negative ROS  GI/Hepatic/Renal/Endo    (+) GERD  (-) GI bleed    Musculoskeletal (-) negative ROS    Abdominal    Substance History - negative use     OB/GYN    (+) Pregnant        Other                    Anesthesia Plan    ASA 2     epidural     (Intrauterine pregnancy at 39w3d)    Anesthetic plan, risks, benefits, and alternatives have been provided, discussed and informed consent has been obtained with: patient.    CODE STATUS:    Level Of Support Discussed With: Patient  Code Status (Patient has no pulse and is not breathing): CPR (Attempt to Resuscitate)  Medical Interventions (Patient has pulse or is breathing): Full Support

## 2025-02-22 NOTE — PROGRESS NOTES
[facility]  Obstetric Progress Note    Subjective     Patient:    The patient feels comfortable with epidural.     Objective     Vital Signs Range for the last 24 hours  Temp:  [98 °F (36.7 °C)-98.4 °F (36.9 °C)] 98.4 °F (36.9 °C)   Temp src: Oral   BP: ()/(40-92) 100/55   Heart Rate:  [] 90   Resp:  [16] 16   SpO2:  [98 %-100 %] 100 %           Weight:  [85.3 kg (188 lb)] 85.3 kg (188 lb)       Flowsheet Rows      Flowsheet Row First Filed Value   Admission Height --   Admission Weight 85.3 kg (188 lb) Documented at 02/22/2025 0917            Intake/Output last 24 hours:    No intake or output data in the 24 hours ending 02/22/25 1405    Intake/Output this shift:    No intake/output data recorded.    Physical Exam:  General: Patient is comfortable and in no acute distress                     Presentation: vtx   Cervix: Exam by: Method: sterile vaginal exam performed   Dilation: Cervical Dilation (cm): 9   Effacement: Cervical Effacement: 100   Station:    AROM with clear fluid noted following verbal consent         Fetal Heart Rate Assessment   Method: Fetal HR Assessment Method: external   Beats/min: Fetal HR (beats/min): 125   Baseline: Fetal HR Baseline: normal range   Variability: Fetal HR Variability: moderate (amplitude range 6 to 25 bpm)   Accels: Fetal HR Accelerations: greater than/equal to 15 bpm, lasting at least 15 seconds   Decels: Fetal HR Decelerations: absent   Tracing Category:       Uterine Assessment   Method: Method: external tocotransducer, palpation   Frequency (min): Contraction Frequency (Minutes): 6-9   Ctx Count in 10 min:     Duration:     Intensity: Contraction Intensity: moderate by palpation   Intensity by IUPC:     Resting Tone: Uterine Resting Tone: soft by palpation   Resting Tone by IUPC:     Pomeroy Units:         Assessment & Plan       Normal labor        Assessment:  1.  Intrauterine pregnancy at 39w3d gestation with reactive fetal status.    2.  labor  with  ROM  3.  Obstetrical history significant for  prior vag delivery .  4.  GBS status:   Strep Gp B LINUS   Date Value Ref Range Status   2025 Positive (A) Negative Final     Comment:     Centers for Disease Control and Prevention (CDC) and American Congress  of Obstetricians and Gynecologists (ACOG) guidelines for prevention of   group B streptococcal (GBS) disease specify co-collection of  a vaginal and rectal swab specimen to maximize sensitivity of GBS  detection. Per the CDC and ACOG, swabbing both the lower vagina and  rectum substantially increases the yield of detection compared with  sampling the vagina alone.  Penicillin G, ampicillin, or cefazolin are indicated for intrapartum  prophylaxis of  GBS colonization. Reflex susceptibility  testing should be performed prior to use of clindamycin only on GBS  isolates from penicillin-allergic women who are considered a high risk  for anaphylaxis. Treatment with vancomycin without additional testing  is warranted if resistance to clindamycin is noted.         Plan:  1. fetal and uterine monitoring  continuously, analgesia with  epidural, and antibiotic for GBS  2. Plan of care has been reviewed with patient and partner  3.  Risks, benefits of treatment plan have been discussed.  4.  All questions have been answered.        Anahi Cobian MD  2025  14:05 EST

## 2025-02-22 NOTE — ANESTHESIA PROCEDURE NOTES
Labor Epidural      Patient reassessed immediately prior to procedure    Patient location during procedure: OB  Performed By  Anesthesiologist: Lester Nicholson MD  Preanesthetic Checklist  Completed: patient identified, IV checked, site marked, risks and benefits discussed, surgical consent, monitors and equipment checked, pre-op evaluation and timeout performed  Prep:  Pt Position:sitting  Sterile Tech:gloves, cap, sterile barrier and mask  Prep:chlorhexidine gluconate and isopropyl alcohol  Monitoring:continuous pulse oximetry, EKG and blood pressure monitoring  Epidural Block Procedure:  Approach:midline  Guidance:palpation technique  Location:L3-L4  Needle Type:Tuohy  Needle Gauge:17 G  Loss of Resistance Medium: saline  Loss of Resistance: 5cm  Cath Depth at skin:10 cm  Paresthesia: none  Aspiration:negative  Test Dose:negative  Number of Attempts: 1  Post Assessment:  Dressing:secured with tape and occlusive dressing applied  Pt Tolerance:patient tolerated the procedure well with no apparent complications  Complications:no

## 2025-02-22 NOTE — OBED NOTES
[facility]  Thomas Whitman  : 1990  MRN: [id number]  CSN: [id number]    OB ED Provider Note    Subjective   Chief Complaint   Patient presents with    Contractions     Irregular contractions through the night. Regular contractions starting at 0500 every 2-3 minutes     Thomas Whitman is a 35 y.o. year old  with an Estimated Date of Delivery: 25 currently at 39w3d presenting with regular, painful contractions for the past few hours. She denies leaking, bleeding. She endorses     Prenatal care has been with Dr. Barkley.  It has been complicated by AMA and h/o anemia.    OB History    Para Term  AB Living   3 1 1 0 0 1   SAB IAB Ectopic Molar Multiple Live Births   0 0 0 0 1 1      # Outcome Date GA Lbr Cortez/2nd Weight Sex Type Anes PTL Lv   3 Current            2 Term 22 40w5d 09:24 / 01:51 3300 g (7 lb 4.4 oz) M Vag-Spont EPI N PAWAN      Birth Comments: panda LR 6      Name: ROBBI POP      Apgar1: 8  Apgar5: 9   1A             1B               Past Medical History:   Diagnosis Date    Anemia 2019    r/t GI bleeding    Abnormal Pap smear of cervix     Ascus, +hpv,2018    ASCUS with positive high risk HPV cervical     2018    Constipation     GI (gastrointestinal bleed) 10/2018-2019    resolved    History of chicken pox     History of shingles     HPV in female     UTI (urinary tract infection)      Past Surgical History:   Procedure Laterality Date    COLONOSCOPY  2019    chronic rectal inflamation    COLONOSCOPY W/ BIOPSIES  2019    friable (with spontaneous bleeding) and granular mucosa in rectum NBIH, per path chronic proctitis    ENDOSCOPY N/A 2020    Procedure: ESOPHAGOGASTRODUODENOSCOPY with cold bx;  Surgeon: Mandy Florence MD;  Location: [facility] ENDOSCOPY;  Service: Gastroenterology;  Laterality: N/A;  pre - epigastric pain, n/vpost - gastritis    D & C WITH SUCTION N/A 2024    Procedure: SUCTION  DILATATION AND  CURETTAGE;  Surgeon: Tye Barkley MD;  Location: [facility] MAIN OR;  Service: Obstetrics/Gynecology;  Laterality: N/A;    WISDOM TOOTH EXTRACTION       No current facility-administered medications for this encounter.    Allergies   Allergen Reactions    Amoxicillin Itching    Macrobid [Nitrofurantoin Monohyd Macro] Itching    Sulfa Antibiotics Itching     Social History    Tobacco Use      Smoking status: Former        Types: Electronic Cigarette      Smokeless tobacco: Current      Tobacco comments: quit cigarettes 11/2017.  use vaporizer since    Review of Systems  +abdominal pain      Objective   LMP 05/23/2024 (Approximate)   General: well developed; well nourished  no acute distress  mentation appropriate   Abdomen: soft, non-tender; no masses  gravid    FHT's: 140, moderate, +accels, no decels; Cat I tracing      Cervix: was checked (by RN): 4 cm / 80 % / -2   Presentation: cephalic   Contractions: regular   Chest: Unlabored respirations    CV:  normal rate, regular rhythm,  no murmurs, rubs, or gallops   Ext:   No C/C/E   Back: CVA tenderness is absent bilateral        Prenatal Labs  Lab Results   Component Value Date    HGB 12.3 11/27/2024    RUBELLAABIGG 1.58 06/24/2024    HEPBSAG Negative 06/24/2024    ABSCRN Negative 06/24/2024    RNH0WHW7 Non Reactive 06/24/2024    HEPCVIRUSABY Non Reactive 06/24/2024     11/27/2024    STREPGPB Positive (A) 01/28/2025    URINECX No growth 01/13/2025    CHLAMNAA Negative 12/01/2020    NGONORRHON Negative 12/01/2020        Assessment and Plan  IUP at 39w3d with spontaneous labor  GBS positive  Fetal status reassuring, Category I tracing         Berkley Shannon MD  2/22/2025  07:59 EST

## 2025-02-22 NOTE — ANESTHESIA POSTPROCEDURE EVALUATION
Patient: Thomas Whitman    Procedure Summary       Date: 02/22/25 Room / Location:     Anesthesia Start: 0948 Anesthesia Stop: 1434    Procedure: LABOR ANALGESIA Diagnosis:     Scheduled Providers:  Provider: Lester Nicholson MD    Anesthesia Type: epidural ASA Status: 2            Anesthesia Type: epidural    Vitals  Vitals Value Taken Time   /67 02/22/25 1710   Temp 36.8 °C (98.3 °F) 02/22/25 1710   Pulse 80 02/22/25 1710   Resp 16 02/22/25 1710   SpO2 97 % 02/22/25 1509   Vitals shown include unfiled device data.        Post Anesthesia Care and Evaluation    Patient location during evaluation: bedside  Patient participation: complete - patient participated  Level of consciousness: awake  Pain management: adequate    Airway patency: patent  Anesthetic complications: No anesthetic complications  PONV Status: none  Cardiovascular status: acceptable  Respiratory status: acceptable  Hydration status: acceptable  Post Neuraxial Block status: No signs or symptoms of PDPH

## 2025-02-22 NOTE — PLAN OF CARE
Problem: Adult Inpatient Plan of Care  Goal: Plan of Care Review  Outcome: Progressing  Flowsheets (Taken 2025 172)  Outcome Evaluation: Pt arrived laboring, epidural for pain control.  AROM,  at approx 1434. First degree and periurethral tear that were repair at the bedside.  VS and bleeding wnl.  Transferred to mother baby with no complications.  Plan of Care Reviewed With: patient  Goal: Patient-Specific Goal (Individualized)  Outcome: Progressing  Flowsheets (Taken 2025 1727)  Patient/Family-Specific Goals (Include Timeframe): healthy mom and baby by discharge  Individualized Care Needs: jose breastfeeding  Anxieties, Fears or Concerns: none  Goal: Absence of Hospital-Acquired Illness or Injury  Outcome: Progressing  Intervention: Identify and Manage Fall Risk  Recent Flowsheet Documentation  Taken 2025 1630 by Linda Olmos RN  Safety Promotion/Fall Prevention:   activity supervised   clutter free environment maintained   fall prevention program maintained  Taken 2025 1307 by Linda Olmos RN  Safety Promotion/Fall Prevention:   activity supervised   clutter free environment maintained   fall prevention program maintained   nonskid shoes/slippers when out of bed   safety round/check completed  Taken 2025 0932 by Linda Olmos, RN  Safety Promotion/Fall Prevention:   activity supervised   clutter free environment maintained   fall prevention program maintained   nonskid shoes/slippers when out of bed   safety round/check completed  Intervention: Prevent Infection  Recent Flowsheet Documentation  Taken 2025 1645 by Linda Olmos, RN  Infection Prevention:   cohorting utilized   environmental surveillance performed   equipment surfaces disinfected   hand hygiene promoted   personal protective equipment utilized   rest/sleep promoted   single patient room provided   visitors restricted/screened  Taken 2025 1630 by Linda Olmos, RN  Infection Prevention:    cohorting utilized   environmental surveillance performed   equipment surfaces disinfected   hand hygiene promoted   personal protective equipment utilized   rest/sleep promoted   single patient room provided   visitors restricted/screened  Taken 2/22/2025 1615 by Linda Olmos RN  Infection Prevention:   cohorting utilized   environmental surveillance performed   equipment surfaces disinfected   hand hygiene promoted   personal protective equipment utilized   rest/sleep promoted   single patient room provided   visitors restricted/screened  Taken 2/22/2025 1600 by Linda Olmos RN  Infection Prevention:   cohorting utilized   environmental surveillance performed   equipment surfaces disinfected   hand hygiene promoted   personal protective equipment utilized   rest/sleep promoted   single patient room provided   visitors restricted/screened  Taken 2/22/2025 1530 by Linda Olmos RN  Infection Prevention:   cohorting utilized   environmental surveillance performed   equipment surfaces disinfected   hand hygiene promoted   personal protective equipment utilized   rest/sleep promoted   single patient room provided   visitors restricted/screened  Taken 2/22/2025 1515 by Linda Olmos RN  Infection Prevention:   cohorting utilized   environmental surveillance performed   equipment surfaces disinfected   hand hygiene promoted   personal protective equipment utilized   rest/sleep promoted   single patient room provided   visitors restricted/screened  Taken 2/22/2025 1500 by Linda Olmos RN  Infection Prevention:   cohorting utilized   equipment surfaces disinfected   environmental surveillance performed   hand hygiene promoted   personal protective equipment utilized   rest/sleep promoted   single patient room provided   visitors restricted/screened  Taken 2/22/2025 1307 by Linda Olmos RN  Infection Prevention:   cohorting utilized   environmental surveillance performed   equipment surfaces  disinfected   hand hygiene promoted   personal protective equipment utilized   rest/sleep promoted   single patient room provided   visitors restricted/screened  Taken 2/22/2025 0932 by Linda Olmos RN  Infection Prevention:   cohorting utilized   environmental surveillance performed   equipment surfaces disinfected   personal protective equipment utilized   hand hygiene promoted   rest/sleep promoted   single patient room provided   visitors restricted/screened  Goal: Optimal Comfort and Wellbeing  Outcome: Progressing  Intervention: Provide Person-Centered Care  Recent Flowsheet Documentation  Taken 2/22/2025 1630 by Linda Olmos RN  Trust Relationship/Rapport:   care explained   choices provided   emotional support provided   empathic listening provided   questions answered   questions encouraged   reassurance provided   thoughts/feelings acknowledged  Taken 2/22/2025 0932 by Linda Olmos RN  Trust Relationship/Rapport:   care explained   choices provided   emotional support provided   empathic listening provided   questions answered   questions encouraged   reassurance provided   thoughts/feelings acknowledged  Goal: Readiness for Transition of Care  Outcome: Progressing     Problem: Labor  Goal: Hemostasis  Outcome: Progressing  Goal: Stable Fetal Wellbeing  Outcome: Progressing  Goal: Effective Progression to Delivery  Outcome: Progressing  Goal: Absence of Infection Signs and Symptoms  Outcome: Progressing  Intervention: Prevent or Manage Infection  Recent Flowsheet Documentation  Taken 2/22/2025 1645 by Linda Olmos RN  Infection Prevention:   cohorting utilized   environmental surveillance performed   equipment surfaces disinfected   hand hygiene promoted   personal protective equipment utilized   rest/sleep promoted   single patient room provided   visitors restricted/screened  Taken 2/22/2025 1630 by Linda Olmos RN  Infection Prevention:   cohorting utilized   environmental  surveillance performed   equipment surfaces disinfected   hand hygiene promoted   personal protective equipment utilized   rest/sleep promoted   single patient room provided   visitors restricted/screened  Taken 2/22/2025 1615 by Linda Olmos RN  Infection Prevention:   cohorting utilized   environmental surveillance performed   equipment surfaces disinfected   hand hygiene promoted   personal protective equipment utilized   rest/sleep promoted   single patient room provided   visitors restricted/screened  Taken 2/22/2025 1600 by Linda Olmos RN  Infection Prevention:   cohorting utilized   environmental surveillance performed   equipment surfaces disinfected   hand hygiene promoted   personal protective equipment utilized   rest/sleep promoted   single patient room provided   visitors restricted/screened  Taken 2/22/2025 1530 by Linda Olmos RN  Infection Prevention:   cohorting utilized   environmental surveillance performed   equipment surfaces disinfected   hand hygiene promoted   personal protective equipment utilized   rest/sleep promoted   single patient room provided   visitors restricted/screened  Taken 2/22/2025 1515 by Linda Olmos RN  Infection Prevention:   cohorting utilized   environmental surveillance performed   equipment surfaces disinfected   hand hygiene promoted   personal protective equipment utilized   rest/sleep promoted   single patient room provided   visitors restricted/screened  Taken 2/22/2025 1500 by Linda Olmos RN  Infection Prevention:   cohorting utilized   equipment surfaces disinfected   environmental surveillance performed   hand hygiene promoted   personal protective equipment utilized   rest/sleep promoted   single patient room provided   visitors restricted/screened  Taken 2/22/2025 1307 by Linda Olmos RN  Infection Prevention:   cohorting utilized   environmental surveillance performed   equipment surfaces disinfected   hand hygiene promoted    personal protective equipment utilized   rest/sleep promoted   single patient room provided   visitors restricted/screened  Taken 2/22/2025 0932 by Linda Olmos RN  Infection Prevention:   cohorting utilized   environmental surveillance performed   equipment surfaces disinfected   personal protective equipment utilized   hand hygiene promoted   rest/sleep promoted   single patient room provided   visitors restricted/screened  Goal: Acceptable Pain Control  Outcome: Progressing  Goal: Normal Uterine Contraction Pattern  Outcome: Progressing     Problem: Anesthesia/Analgesia, Neuraxial  Goal: Safe, Effective Infusion Delivery  Outcome: Progressing  Goal: Stable Patient-Fetal Status  Outcome: Progressing  Goal: Absence of Infection Signs and Symptoms  Outcome: Progressing  Intervention: Prevent or Manage Infection  Recent Flowsheet Documentation  Taken 2/22/2025 1645 by Linda Olmos RN  Infection Prevention:   cohorting utilized   environmental surveillance performed   equipment surfaces disinfected   hand hygiene promoted   personal protective equipment utilized   rest/sleep promoted   single patient room provided   visitors restricted/screened  Taken 2/22/2025 1630 by Linda Olmos RN  Infection Prevention:   cohorting utilized   environmental surveillance performed   equipment surfaces disinfected   hand hygiene promoted   personal protective equipment utilized   rest/sleep promoted   single patient room provided   visitors restricted/screened  Taken 2/22/2025 1615 by Linda Olmos RN  Infection Prevention:   cohorting utilized   environmental surveillance performed   equipment surfaces disinfected   hand hygiene promoted   personal protective equipment utilized   rest/sleep promoted   single patient room provided   visitors restricted/screened  Taken 2/22/2025 1600 by Linda Olmos RN  Infection Prevention:   cohorting utilized   environmental surveillance performed   equipment surfaces  disinfected   hand hygiene promoted   personal protective equipment utilized   rest/sleep promoted   single patient room provided   visitors restricted/screened  Taken 2/22/2025 1530 by Linda Olmos RN  Infection Prevention:   cohorting utilized   environmental surveillance performed   equipment surfaces disinfected   hand hygiene promoted   personal protective equipment utilized   rest/sleep promoted   single patient room provided   visitors restricted/screened  Taken 2/22/2025 1515 by Linda Olmos RN  Infection Prevention:   cohorting utilized   environmental surveillance performed   equipment surfaces disinfected   hand hygiene promoted   personal protective equipment utilized   rest/sleep promoted   single patient room provided   visitors restricted/screened  Taken 2/22/2025 1500 by Linda Olmos RN  Infection Prevention:   cohorting utilized   equipment surfaces disinfected   environmental surveillance performed   hand hygiene promoted   personal protective equipment utilized   rest/sleep promoted   single patient room provided   visitors restricted/screened  Taken 2/22/2025 1307 by Linda Olmos RN  Infection Prevention:   cohorting utilized   environmental surveillance performed   equipment surfaces disinfected   hand hygiene promoted   personal protective equipment utilized   rest/sleep promoted   single patient room provided   visitors restricted/screened  Taken 2/22/2025 0932 by Linda Olmos RN  Infection Prevention:   cohorting utilized   environmental surveillance performed   equipment surfaces disinfected   personal protective equipment utilized   hand hygiene promoted   rest/sleep promoted   single patient room provided   visitors restricted/screened  Goal: Nausea and Vomiting Relief  Outcome: Progressing  Goal: Optimal Pain Control and Function  Outcome: Progressing  Goal: Effective Oxygenation and Ventilation  Outcome: Progressing  Goal: Baseline Motor Function Return  Outcome:  Progressing  Intervention: Optimize Sensorimotor Function and Safety  Recent Flowsheet Documentation  Taken 2025 1630 by Linda Olmos RN  Safety Promotion/Fall Prevention:   activity supervised   clutter free environment maintained   fall prevention program maintained  Taken 2025 1307 by Linda Olmos RN  Safety Promotion/Fall Prevention:   activity supervised   clutter free environment maintained   fall prevention program maintained   nonskid shoes/slippers when out of bed   safety round/check completed  Taken 2025 0932 by Linda Olmos RN  Safety Promotion/Fall Prevention:   activity supervised   clutter free environment maintained   fall prevention program maintained   nonskid shoes/slippers when out of bed   safety round/check completed  Goal: Effective Urinary Elimination  Outcome: Progressing     Problem: Pain Acute  Goal: Optimal Pain Control and Function  Outcome: Progressing  Intervention: Prevent or Manage Pain  Recent Flowsheet Documentation  Taken 2025 1630 by Linda Olmos RN  Medication Review/Management: medications reviewed  Taken 2025 1307 by Linda Olmos RN  Medication Review/Management: medications reviewed  Taken 2025 0932 by Linda Olmos RN  Medication Review/Management: medications reviewed     Problem:  Fall Injury Risk  Goal: Absence of Fall, Infant Drop and Related Injury  Outcome: Progressing  Intervention: Identify and Manage Contributors  Recent Flowsheet Documentation  Taken 2025 1630 by Linda Olmos RN  Medication Review/Management: medications reviewed  Taken 2025 1307 by Linda Olmos RN  Medication Review/Management: medications reviewed  Taken 2025 0932 by Linda Olmos RN  Medication Review/Management: medications reviewed  Intervention: Promote Injury-Free Environment  Recent Flowsheet Documentation  Taken 2025 1630 by Linda Olmos RN  Safety Promotion/Fall Prevention:    activity supervised   clutter free environment maintained   fall prevention program maintained  Taken 2025 1307 by Linda Olmos, RN  Safety Promotion/Fall Prevention:   activity supervised   clutter free environment maintained   fall prevention program maintained   nonskid shoes/slippers when out of bed   safety round/check completed  Taken 2025 0932 by Linda Olmos, RN  Safety Promotion/Fall Prevention:   activity supervised   clutter free environment maintained   fall prevention program maintained   nonskid shoes/slippers when out of bed   safety round/check completed     Problem: Skin Injury Risk Increased  Goal: Skin Health and Integrity  Outcome: Progressing   Goal Outcome Evaluation:  Plan of Care Reviewed With: patient           Outcome Evaluation: Pt arrived laboring, epidural for pain control.  AROM,  at approx 1434. First degree and periurethral tear that were repair at the bedside.  VS and bleeding wnl.  Transferred to mother baby with no complications.

## 2025-02-22 NOTE — L&D DELIVERY NOTE
[facility]   Vaginal Delivery Note    Patient Name: Thomas Whitman  : 1990  MRN: [id number]    Date of Delivery: 2025    Diagnosis     Pre & Post-Delivery:  Intrauterine pregnancy at 39w3d  Labor status: Spontaneous Onset of Labor    Normal labor    Vaginal delivery             Problem List    Transfer to Postpartum     Review the Delivery Report for details.     Delivery     Delivery: Vaginal, Spontaneous    YOB: 2027   Time of Birth:  Gestational Age 2:34 PM  39w3d     Anesthesia: Epidural    Delivering clinician: Anahi Cobian   Forceps?   No   Vacuum? No    Shoulder dystocia present: No        Delivery narrative:  35 year old G 2 P 1 was admitted for labor. She had reassuring fetal heart tones and received epidural for pain control. She progressed spontaneously and underwent amniotomy with clear fluid at 1400. She progressed to second stage and delivered via spontaneous vaginal delivery after a few pushes. The shoulders and body delivered easily and the infant was placed on her chest for kangaroo care. The umbilical cord was clamped and cut but the patient's mother after 30 seconds. The placenta delivered spontaneously and appeared intact. Manual exploration of the uterus did not reveal retained products. Katie urethral lacerations were made hemostatic with figure of eight stitches of 4-0 monocryl. A second degree midline laceration was noted and repaired. The cervix and vagina appeared intact. All counts were correct following.                Addendum: 25 @ 1614: pt received kefzol for GBS prophylaxis.      Infant     Findings: male infant     Infant observations: Weight: No birth weight on file.  Length:   in  Observations/Comments:  Scale 3     Apgars: 8  @ 1 minute /    9  @ 5 minutes   Infant Name: August     Placenta & Cord         Placenta delivered  Spontaneous at   2025  2:40 PM    Cord: 3 vessels present.   Nuchal Cord?  no   Cord blood obtained: Yes    Cord gases obtained:  No             Repair     Episiotomy: None    No    Lacerations: Yes  Laceration Information  Laceration Repaired?   Perineal: 2nd Yes   Periurethral:   Yes   Labial:       Sulcus:       Vaginal:       Cervical:         Suture used for repair: 3-0, 4-0 Vicryl     Estimated Blood Loss:  See QBL     Quantitative Blood Loss: Quantitative Blood Loss (mL): 426 mL (02/22/25 1500)        Complications     none    Disposition     Mother to Mother Baby/Postpartum  in stable condition currently.  Baby to NBN  in stable condition currently.    Anahi Cobian MD  02/22/25  15:16 EST

## 2025-02-23 VITALS
WEIGHT: 188 LBS | HEART RATE: 94 BPM | RESPIRATION RATE: 16 BRPM | OXYGEN SATURATION: 100 % | DIASTOLIC BLOOD PRESSURE: 71 MMHG | TEMPERATURE: 98.3 F | SYSTOLIC BLOOD PRESSURE: 102 MMHG | BODY MASS INDEX: 32.27 KG/M2

## 2025-02-23 LAB
BASOPHILS # BLD AUTO: 0.03 10*3/MM3 (ref 0–0.2)
BASOPHILS NFR BLD AUTO: 0.3 % (ref 0–1.5)
DEPRECATED RDW RBC AUTO: 37.8 FL (ref 37–54)
EOSINOPHIL # BLD AUTO: 0.06 10*3/MM3 (ref 0–0.4)
EOSINOPHIL NFR BLD AUTO: 0.6 % (ref 0.3–6.2)
ERYTHROCYTE [DISTWIDTH] IN BLOOD BY AUTOMATED COUNT: 11.9 % (ref 12.3–15.4)
HCT VFR BLD AUTO: 29.5 % (ref 34–46.6)
HGB BLD-MCNC: 10.2 G/DL (ref 12–15.9)
IMM GRANULOCYTES # BLD AUTO: 0.1 10*3/MM3 (ref 0–0.05)
IMM GRANULOCYTES NFR BLD AUTO: 1 % (ref 0–0.5)
LYMPHOCYTES # BLD AUTO: 1.92 10*3/MM3 (ref 0.7–3.1)
LYMPHOCYTES NFR BLD AUTO: 18.9 % (ref 19.6–45.3)
MCH RBC QN AUTO: 30 PG (ref 26.6–33)
MCHC RBC AUTO-ENTMCNC: 34.6 G/DL (ref 31.5–35.7)
MCV RBC AUTO: 86.8 FL (ref 79–97)
MONOCYTES # BLD AUTO: 0.65 10*3/MM3 (ref 0.1–0.9)
MONOCYTES NFR BLD AUTO: 6.4 % (ref 5–12)
NEUTROPHILS NFR BLD AUTO: 7.41 10*3/MM3 (ref 1.7–7)
NEUTROPHILS NFR BLD AUTO: 72.8 % (ref 42.7–76)
NRBC BLD AUTO-RTO: 0 /100 WBC (ref 0–0.2)
PLATELET # BLD AUTO: 189 10*3/MM3 (ref 140–450)
PMV BLD AUTO: 11 FL (ref 6–12)
RBC # BLD AUTO: 3.4 10*6/MM3 (ref 3.77–5.28)
WBC NRBC COR # BLD AUTO: 10.17 10*3/MM3 (ref 3.4–10.8)

## 2025-02-23 PROCEDURE — 85025 COMPLETE CBC W/AUTO DIFF WBC: CPT | Performed by: OBSTETRICS & GYNECOLOGY

## 2025-02-23 RX ORDER — IBUPROFEN 600 MG/1
600 TABLET, FILM COATED ORAL EVERY 6 HOURS PRN
Qty: 30 TABLET | Refills: 0 | Status: SHIPPED | OUTPATIENT
Start: 2025-02-23

## 2025-02-23 RX ADMIN — IBUPROFEN 600 MG: 600 TABLET, FILM COATED ORAL at 11:49

## 2025-02-23 RX ADMIN — DOCUSATE SODIUM 100 MG: 100 CAPSULE, LIQUID FILLED ORAL at 08:16

## 2025-02-23 RX ADMIN — IBUPROFEN 600 MG: 600 TABLET, FILM COATED ORAL at 03:06

## 2025-02-23 RX ADMIN — ACETAMINOPHEN 650 MG: 325 TABLET, FILM COATED ORAL at 08:16

## 2025-02-23 NOTE — PROGRESS NOTES
Postpartum Vaginal Delivery Note PPD#1    CC: PPD 1 s/p     Assessment:   Pt doing well. Pain well controlled. Lochia normal. Ambulating well. Tolerating regular diet. Voiding without difficulty. No complaints.  Desires infant circ, RBA discussed    Review of Systems - Negative except cramping    Vitals:   /71 (BP Location: Left arm, Patient Position: Lying)   Pulse 94   Temp 98.3 °F (36.8 °C) (Oral)   Resp 16   Wt 85.3 kg (188 lb)   LMP 2024 (Approximate)   SpO2 100%   Breastfeeding Yes   BMI 32.27 kg/m²         Exam:   Gen: NAD, cooperative, conversive  Neck:  No LAD or TM  Lungs: non labored breathing  Heart:  RRR  Abd: Soft, nondistended, fundus is firm below umbillicus, approp tender  Ext: Nontender bilaterally, trace edema bilateral    Labs:  Lab Results (last 24 hours)       Procedure Component Value Units Date/Time    CBC & Differential [[id number]]  (Abnormal) Collected: 25    Specimen: Blood Updated: 25    Narrative:      The following orders were created for panel order CBC & Differential.  Procedure                               Abnormality         Status                     ---------                               -----------         ------                     CBC Auto Differential[[id number]]        Abnormal            Final result                 Please view results for these tests on the individual orders.    CBC Auto Differential [[id number]]  (Abnormal) Collected: 25    Specimen: Blood Updated: 25     WBC 10.17 10*3/mm3      RBC 3.40 10*6/mm3      Hemoglobin 10.2 g/dL      Hematocrit 29.5 %      MCV 86.8 fL      MCH 30.0 pg      MCHC 34.6 g/dL      RDW 11.9 %      RDW-SD 37.8 fl      MPV 11.0 fL      Platelets 189 10*3/mm3      Neutrophil % 72.8 %      Lymphocyte % 18.9 %      Monocyte % 6.4 %      Eosinophil % 0.6 %      Basophil % 0.3 %      Immature Grans % 1.0 %      Neutrophils, Absolute 7.41 10*3/mm3      Lymphocytes, Absolute  1.92 10*3/mm3      Monocytes, Absolute 0.65 10*3/mm3      Eosinophils, Absolute 0.06 10*3/mm3      Basophils, Absolute 0.03 10*3/mm3      Immature Grans, Absolute 0.10 10*3/mm3      nRBC 0.0 /100 WBC             Assessment: Thomas Whitman is a 35 y.o. female  s/p   Patient Active Problem List   Diagnosis    HPV in female    Nausea    Gastroesophageal reflux disease without esophagitis    Antepartum multigravida of advanced maternal age    GBS (group B Streptococcus carrier), +RV culture, currently pregnant    Normal labor    Vaginal delivery       Plan:  1. Routine Postpartum care  2. Ambulation encouraged.  3. Pt desires discharge today         Signed By:  Zee Trent MD       2025 11:08 EST

## 2025-02-23 NOTE — LACTATION NOTE
Lactation Consult Note  P3, T baby-16 h.o Mom BF her last child for 10 months. Rounded on her at this time and she asked for help with deeper latch. Infant's mouth assessed and significant TT noticed. Discussed with parents different ways for frenotomy to be performed.Assisted mom with latching baby on the left breast in a cross cradle/laid back position. Educated PT on starting nipple to nose to achieve deep latch and baby was able to achieve it quickly. Mom said it's still slightly pinching, so chin tug demonstrated and per PT it doesn't hurt at all. Infant is latching well,  and has a good jaw rotation. Educated on the importance of deep latch, hand expression, how to know if infant is getting enough and burping baby between breasts. Mother is able to express colostrum easily. She denies any questions. Mom has an old PBP and will get hands free one.Encouraged to call if needing further help.       Evaluation Completed: 2025 11:17 EST  Patient Name: Thomas Whitman  :  1990  MRN:  [id number]     REFERRAL  INFORMATION:                          Date of Referral: 25   Person Making Referral: patient  Maternal Reason for Referral: breastfeeding currently  Infant Reason for Referral: tight frenulum    DELIVERY HISTORY:        Skin to skin initiation date/time: 2025 2:37 PM  Skin to skin end date/time:           MATERNAL ASSESSMENT:  Breast Size Issue: none (25)  Breast Shape: Bilateral:, round (25)  Breast Density: Bilateral:, soft (25)  Areola: Bilateral:, elastic (25)  Nipples: Bilateral:, everted, graspable, other (see comments) (long) (25)                INFANT ASSESSMENT:  Information for the patient's :  Calvin Whitman [[id number]]   History reviewed. No pertinent past medical history.  Feeding Readiness Cues: rooting (25)     Feeding Tolerance/Success: arousal required, sleepy, strong suck, suck inconsistent  (25)              Feeding Interventions: arousal required, latch assistance provided, lips stroked, sucking promoted (25)              Breastfeeding: breastfeeding, left side only (25)  Infant Positioning: cradle, cross-cradle (25)        Effective Latch During Feeding: yes (25)  Suck/Swallow/Breathing Coordination: present (25)  Signs of Milk Transfer: deep jaw excursions noted (25)      Latch: 2-->grasps breast, tongue down, lips flanged, rhythmic sucking (25)  Audible Swallowin-->a few with stimulation (25)  Type of Nipple: 2-->everted (after stimulation) (25)  Comfort (Breast/Nipple): 2-->soft/nontender (25)  Hold (Positioning): 1-->minimal assist, teach one side, mother does other, staff holds (25)  Latch Score: 8 (25)                   MATERNAL INFANT FEEDING:     Maternal Emotional State: receptive, relaxed (25)  Infant Positioning: cross-cradle, cradle (25)   Signs of Milk Transfer: deep jaw excursions noted (25)  Pain with Feeding: no (25)           Milk Ejection Reflex: present (25)           Latch Assistance: minimal assistance (25)                               EQUIPMENT TYPE:                                 BREAST PUMPING:          LACTATION REFERRALS:

## 2025-02-23 NOTE — PLAN OF CARE
Goal Outcome Evaluation:  Plan of Care Reviewed With: patient        Progress: improving  Outcome Evaluation: vss. fundus and lochia wnl. pain controlled with prn meds. desires discharge today. bonding well with infant.

## 2025-02-23 NOTE — DISCHARGE SUMMARY
[facility]  Delivery Discharge Summary    Discharge Diagnosis:     Vaginal delivery      Patient Active Problem List   Diagnosis    HPV in female    Nausea    Gastroesophageal reflux disease without esophagitis    Antepartum multigravida of advanced maternal age    GBS (group B Streptococcus carrier), +RV culture, currently pregnant    Normal labor    Vaginal delivery        Primary OB Clinician: Filippo    EDC: Estimated Date of Delivery: 25    Gestational Age:39w3d    Antepartum complications: none    Date of Delivery: 2025  Time of Delivery: 2:34 PM    Delivered By:  Anahi Cobian    Delivery Type: Vaginal, Spontaneous     Tubal Ligation: n/a    Baby: male infant;   Apgar:  8  @ 1 minute /   Apgar:  9  @ 5 minutes        Anesthesia: Epidural     Intrapartum complications: None    Laceration: No    Episiotomy: No    Placenta: Spontaneous    Feeding method: Breastfeeding Status: Yes    Rh Immune globulin given: not applicable    Rubella vaccine given: no    Discharge Date: 2025; Discharge Time: 15:12 EST    Early Discharge:  NO  Hospital Course:  The patient was admitted following delivery.  She did well postpartum with normal return of bowel function and remained afebrile.    Blood type: O+  Rubella immune  dTap given prenatally        Plan:    Follow-up appointment with Dr Lemus in 2 weeks.

## 2025-02-24 ENCOUNTER — MATERNAL SCREENING (OUTPATIENT)
Dept: CALL CENTER | Facility: HOSPITAL | Age: 35
End: 2025-02-24
Payer: COMMERCIAL

## 2025-02-24 NOTE — PAYOR COMM NOTE
"SouzaJannette (35 y.o. Female)       Date of Birth   1990    Social Security Number       Address   [address] [address]    Home Phone   [phone]    MRN   [id number]       [facility]   [employer]    Marital Status                               Admission Date   2/22/25    Admission Type   Emergency    Admitting Provider   Darcy Shannon MD    Attending Provider       Department, Room/Bed   [address], [address]353/1       Discharge Date   2/23/2025    Discharge Disposition   Home or Self Care    Discharge Destination                                 Attending Provider: (none)   Allergies: Amoxicillin, Macrobid [Nitrofurantoin Monohyd Macro], Sulfa Antibiotics    Isolation: None   Infection: None   Code Status: Prior    Ht: 162.6 cm (64\")   Wt: 85.3 kg (188 lb)    Admission Cmt: None   Principal Problem: Normal labor [O80,Z37.9]                   Active Insurance as of 2/22/2025       Primary Coverage       Payor Plan Insurance Group Employer/Plan Group    [employer] [employer] [id number]       Payor Plan Address Payor Plan Phone Number Payor Plan Fax Number Effective Dates    [address] [phone]  7/1/2024 - None Entered    [place] [address]         Subscriber Name Subscriber Birth Date Member ID       JANNETTE SOUZA 1990 [id number]                     Emergency Contacts        (Rel.) Home Phone Work Phone Mobile Phone    Angel Florian (Spouse) [phone] -- --    TO GERMAN (Mother) [phone] -- --              Insurance Information                  [employer] BLUE [employer]OSS/[employer] BLUE [employer] PPO Phone: [phone]    Subscriber: Jannette Souza Subscriber#: [id number]    Group#: [id number] Precert#: --    Authorization#: -- Effective Date: --          Problem List             Codes Noted - Resolved       Hospital    * (Principal) Normal labor ICD-10-CM: O80, Z37.9  ICD-9-CM: 650 2/22/2025 - " Present    Vaginal delivery ICD-10-CM: O80  ICD-9-CM: 650 2025 - Present       Non-Hospital    GBS (group B Streptococcus carrier), +RV culture, currently pregnant ICD-10-CM: O99.820  ICD-9-CM: V23.89, V02.51 2025 - Present    Antepartum multigravida of advanced maternal age ICD-10-CM: O09.529  ICD-9-CM: 659.63 2024 - Present    Gastroesophageal reflux disease without esophagitis ICD-10-CM: K21.9  ICD-9-CM: 530.81 2022 - Present    Nausea ICD-10-CM: R11.0  ICD-9-CM: 787.02 2020 - Present    HPV in female ICD-10-CM: B97.7  ICD-9-CM: 079.4 2018 - Present        History & Physical        Anahi Cobian MD at 25 0843          [facility]  Obstetric History and Physical    Chief Complaint   Patient presents with    Contractions     Irregular contractions through the night. Regular contractions starting at 0500 every 2-3 minutes       Subjective    Patient is a 35 y.o. female  currently at 39w3d, who presents with labor. She denies vag bleeding or leaking fluid. She is feeling fetal movement.     Her prenatal care is complicated by   Patient Active Problem List   Diagnosis    HPV in female    Nausea    Gastroesophageal reflux disease without esophagitis    Antepartum multigravida of advanced maternal age    GBS (group B Streptococcus carrier), +RV culture, currently pregnant    Normal labor    .  Her previous obstetric/gynecological history is noted for prior term vaginal delivery.    The following portions of the patients history were reviewed and updated as appropriate: current medications, allergies, past medical history, past surgical history, past family history, past social history, and problem list .       Prenatal Information:  Prenatal Results       Initial Prenatal Labs       Test Value Reference Range Date Time    Hemoglobin  13.7 g/dL 11.1 - 15.9 24 1155    Hematocrit  40.8 % 34.0 - 46.6 24 1155    Platelets  203 x10E3/uL 150 - 450 24 1155     Rubella IgG  1.58 index Immune >0.99 06/24/24 1155    Hepatitis B SAg  Negative  Negative 06/24/24 1155    Hepatitis C Ab  Non Reactive  Non Reactive 06/24/24 1155    RPR  Non Reactive  Non Reactive 11/27/24 1255       Non Reactive  Non Reactive 06/24/24 1155    T. Pallidum Ab         ABO  O   06/24/24 1155    Rh  Positive   06/24/24 1155    Antibody Screen  Negative  Negative 06/24/24 1155    HIV  Non Reactive  Non Reactive 06/24/24 1155    Urine Culture  No growth   01/13/25 1301       Final report   07/09/24        Final report   06/24/24     Gonorrhea        Chlamydia        TSH        HgB A1c         Varicella IgG        Hemoglobinopathy Fractionation        Hemoglobinopathy (genetic testing)        Cystic fibrosis   Negative   08/07/24 1002    Spinal muscular atrophy  Negative   08/07/24 1002    Fragile X                  Fetal testing        Test Value Reference Range Date Time    NIPT        MSAFP  *Screen Negative*   09/04/24 1543    AFP-4                  2nd and 3rd Trimester       Test Value Reference Range Date Time    Hemoglobin (repeated)  12.3 g/dL 11.1 - 15.9 11/27/24 1255    Hematocrit (repeated)        Platelets   203 x10E3/uL 150 - 450 06/24/24 1155    1 hour GTT   124 mg/dL 70 - 139 11/27/24 1255    Antibody Screen (repeated)        3rd TM syphilis scrn (repeated)  RPR   Non Reactive  Non Reactive 11/27/24 1255    3rd TM syphilis scrn (repeated) TP-Ab        3rd TM syphilis screen TB-Ab (FTA)        Syphilis cascade test TP-Ab (EIA)        Syphilis cascade TPPA        GTT Fasting        GTT 1 Hr        GTT 2 Hr        GTT 3 Hr        Group B Strep  Positive  Negative 01/28/25               Other testing        Test Value Reference Range Date Time    Parvo IgG         CMV IgG                   Drug Screening       Test Value Reference Range Date Time    Amphetamine Screen        Barbiturate Screen        Benzodiazepine Screen        Methadone Screen        Phencyclidine Screen        Opiates  Screen        THC Screen        Cocaine Screen        Propoxyphene Screen        Buprenorphine Screen        Methamphetamine Screen        Oxycodone Screen        Tricyclic Antidepressants Screen                  Legend    ^: Historical                          External Prenatal Results       Pregnancy Outside Results - Transcribed From Office Records - See Scanned Records For Details       Test Value Date Time    ABO  O  06/24/24 1155    Rh  Positive  06/24/24 1155    Antibody Screen  Negative  06/24/24 1155    Varicella IgG       Rubella  1.58 index 06/24/24 1155    Hgb  12.3 g/dL 11/27/24 1255       13.7 g/dL 06/24/24 1155    Hct  40.8 % 06/24/24 1155    HgB A1c        1h GTT  124 mg/dL 11/27/24 1255    3h GTT Fasting       3h GTT 1 hour       3h GTT 2 hour       3h GTT 3 hour        Gonorrhea (discrete)       Chlamydia (discrete)       RPR  Non Reactive  11/27/24 1255       Non Reactive  06/24/24 1155    Syphils cascade: TP-Ab (FTA)       TP-Ab       TP-Ab (EIA)       TPPA       HBsAg  Negative  06/24/24 1155    Herpes Simplex Virus PCR       Herpes Simplex VIrus Culture       HIV  Non Reactive  06/24/24 1155    Hep C RNA Quant PCR       Hep C Antibody  Non Reactive  06/24/24 1155    AFP  23.9 ng/mL 09/04/24 1543    NIPT       Cystic Fibrosis (Aleshia)  Negative  08/07/24 1002    Cystic Fibroisis        Spinal Muscular atrophy  Negative  08/07/24 1002    Fragile X       Group B Strep  Positive  01/28/25     GBS Susceptibility to Clindamycin       GBS Susceptibility to Erythromycin       Fetal Fibronectin       Genetic Testing, Maternal Blood                 Drug Screening       Test Value Date Time    Urine Drug Screen       Amphetamine Screen       Barbiturate Screen       Benzodiazepine Screen       Methadone Screen       Phencyclidine Screen       Opiates Screen       THC Screen       Cocaine Screen       Propoxyphene Screen       Buprenorphine Screen       Methamphetamine Screen       Oxycodone Screen        Tricyclic Antidepressants Screen                 Legend    ^: Historical                             Past OB History:     OB History    Para Term  AB Living   3 1 1 0 0 1   SAB IAB Ectopic Molar Multiple Live Births   0 0 0 0 1 1      # Outcome Date GA Lbr Cortez/2nd Weight Sex Type Anes PTL Lv   3 Current            2 Term 22 40w5d 09:24 / 01:51 3300 g (7 lb 4.4 oz) M Vag-Spont EPI N PAWAN      Birth Comments: panda LR 6      Name: ROBBI POP      Apgar1: 8  Apgar5: 9   1A             1B                 Past Medical History: Past Medical History:   Diagnosis Date    Abnormal Pap smear of cervix     Ascus, +hpv,2018    Anemia 2019    r/t GI bleeding    ASCUS with positive high risk HPV cervical     2018    Constipation     GI (gastrointestinal bleed) 10/2018-2019    resolved    History of chicken pox     History of shingles     HPV in female     UTI (urinary tract infection)       Past Surgical History Past Surgical History:   Procedure Laterality Date    COLONOSCOPY  2019    chronic rectal inflamation    COLONOSCOPY W/ BIOPSIES  2019    friable (with spontaneous bleeding) and granular mucosa in rectum NBIH, per path chronic proctitis    D & C WITH SUCTION N/A 2024    Procedure: SUCTION  DILATATION AND CURETTAGE;  Surgeon: Tye Barkley MD;  Location: [facility] MAIN OR;  Service: Obstetrics/Gynecology;  Laterality: N/A;    ENDOSCOPY N/A 2020    Procedure: ESOPHAGOGASTRODUODENOSCOPY with cold bx;  Surgeon: Mandy Florence MD;  Location: [facility] ENDOSCOPY;  Service: Gastroenterology;  Laterality: N/A;  pre - epigastric pain, n/vpost - gastritis    WISDOM TOOTH EXTRACTION        Family History: Family History   Problem Relation Age of Onset    Multiple sclerosis Father     Atrial fibrillation Maternal Grandmother     Diabetes Maternal Grandfather     Colon polyps Maternal Uncle     Colon polyps Maternal Uncle     Irritable bowel syndrome Mother      Irritable bowel syndrome Maternal Aunt       Social History:  reports that she has quit smoking. Her smoking use included electronic cigarette. She uses smokeless tobacco.   reports that she does not currently use alcohol after a past usage of about 1.0 standard drink of alcohol per week.   reports no history of drug use.        General ROS: Pertinent items are noted in HPI    Objective      Vital Signs Range for the last 24 hours  Temperature: Temp:  [98 °F (36.7 °C)] 98 °F (36.7 °C)   Temp Source: Temp src: Oral   BP: BP: (104)/(92) 104/92   Pulse: Heart Rate:  [97] 97   Respirations: Resp:  [16] 16   SPO2: SpO2:  [99 %] 99 %   O2 Amount (l/min):     O2 Devices     Weight: Weight:  [85.3 kg (188 lb)] 85.3 kg (188 lb)     Physical Examination: General appearance - alert, well appearing, and in no distress  Mental status - alert, oriented to person, place, and time  Chest - clear to auscultation, no wheezes, rales or rhonchi, symmetric air entry  Heart - normal rate and regular rhythm  Abdomen - soft, gravid, nontender  Neurological - alert, oriented, normal speech, no focal findings or movement disorder noted  Extremities - bilateral lower ext with trace edema; no cords or tenderness    Presentation:    Cervix: Exam by: Method: sterile vaginal exam performed   Dilation: Cervical Dilation (cm): 4   Effacement: Cervical Effacement: 90   Station:         Fetal Heart Rate Assessment     Reactive    Uterine Assessment    Q 4 min      Assessment & Plan      Normal labor        Assessment:  1.  Intrauterine pregnancy at 39w3d gestation with reactive fetal status.    2.  labor  without ROM  3.  Obstetrical history significant for  prior term vag delivery .  4.  GBS status:   Strep Gp B LINUS   Date Value Ref Range Status   01/28/2025 Positive (A) Negative Final     Comment:     Centers for Disease Control and Prevention (CDC) and American Congress  of Obstetricians and Gynecologists (ACOG) guidelines for prevention  of   group B streptococcal (GBS) disease specify co-collection of  a vaginal and rectal swab specimen to maximize sensitivity of GBS  detection. Per the CDC and ACOG, swabbing both the lower vagina and  rectum substantially increases the yield of detection compared with  sampling the vagina alone.  Penicillin G, ampicillin, or cefazolin are indicated for intrapartum  prophylaxis of  GBS colonization. Reflex susceptibility  testing should be performed prior to use of clindamycin only on GBS  isolates from penicillin-allergic women who are considered a high risk  for anaphylaxis. Treatment with vancomycin without additional testing  is warranted if resistance to clindamycin is noted.         Plan:  1. fetal and uterine monitoring  continuously, expectant management, analgesia with  epidural, and antibiotic for GBS  2. Plan of care has been reviewed with patient and partner  3.  Risks, benefits of treatment plan have been discussed.  4.  All questions have been answered.        Anahi Cobian MD  2025  09:53 EST      Electronically signed by Anahi Cobian MD at 25 0953       Facility-Administered Medications as of 2025   Medication Dose Route Frequency Provider Last Rate Last Admin    [COMPLETED] ceFAZolin 2000 mg IVPB in 100 mL NS (MBP)  2,000 mg Intravenous Once Darcy Shannon MD   2,000 mg at 25 0854    [] erythromycin (ROMYCIN) 5 MG/GM ophthalmic ointment  - ADS Override Pull             [COMPLETED] famotidine (PEPCID) injection 20 mg  20 mg Intravenous Once PRN Lester Nicholson MD   20 mg at 25 1103    [COMPLETED] lactated ringers bolus 1,000 mL  1,000 mL Intravenous Once PRN Darcy Shannon MD 2,000 mL/hr at 25 0854 1,000 mL at 25 0854    [COMPLETED] oxytocin (PITOCIN) 30 units in 0.9% sodium chloride 500 mL (premix)  999 mL/hr Intravenous Once Darcy Shannon  mL/hr at 25 1440 999 mL/hr at 25 1440    Followed  by    [] oxytocin (PITOCIN) 30 units in 0.9% sodium chloride 500 mL (premix)  250 mL/hr Intravenous Continuous Darcy Shannon  mL/hr at 25 1610 125 mL/hr at 25 161    [] phytonadione (VITAMIN K) 1 MG/0.5ML injection  - ADS Override Pull              Lab Results (last 72 hours)       Procedure Component Value Units Date/Time    CBC & Differential [[id number]]  (Abnormal) Collected: 25    Specimen: Blood Updated: 25    Narrative:      The following orders were created for panel order CBC & Differential.  Procedure                               Abnormality         Status                     ---------                               -----------         ------                     CBC Auto Differential[[id number]]        Abnormal            Final result                 Please view results for these tests on the individual orders.    CBC Auto Differential [[id number]]  (Abnormal) Collected: 25    Specimen: Blood Updated: 25     WBC 10.17 10*3/mm3      RBC 3.40 10*6/mm3      Hemoglobin 10.2 g/dL      Hematocrit 29.5 %      MCV 86.8 fL      MCH 30.0 pg      MCHC 34.6 g/dL      RDW 11.9 %      RDW-SD 37.8 fl      MPV 11.0 fL      Platelets 189 10*3/mm3      Neutrophil % 72.8 %      Lymphocyte % 18.9 %      Monocyte % 6.4 %      Eosinophil % 0.6 %      Basophil % 0.3 %      Immature Grans % 1.0 %      Neutrophils, Absolute 7.41 10*3/mm3      Lymphocytes, Absolute 1.92 10*3/mm3      Monocytes, Absolute 0.65 10*3/mm3      Eosinophils, Absolute 0.06 10*3/mm3      Basophils, Absolute 0.03 10*3/mm3      Immature Grans, Absolute 0.10 10*3/mm3      nRBC 0.0 /100 WBC     Treponema pallidum AB w/Reflex RPR [[id number]]  (Normal) Collected: 25    Specimen: Blood Updated: 25     Treponemal AB Total Non-Reactive    Narrative:      Reactive results will reflex RPR testing.    CBC (No Diff) [[id number]]  (Abnormal) Collected: 25     Specimen: Blood Updated: 02/22/25 0901     WBC 9.64 10*3/mm3      RBC 3.95 10*6/mm3      Hemoglobin 11.6 g/dL      Hematocrit 34.5 %      MCV 87.3 fL      MCH 29.4 pg      MCHC 33.6 g/dL      RDW 11.7 %      RDW-SD 37.4 fl      MPV 10.7 fL      Platelets 179 10*3/mm3           Imaging Results (Last 72 Hours)       ** No results found for the last 72 hours. **          ECG/EMG Results (last 72 hours)       ** No results found for the last 72 hours. **          Orders (last 72 hrs)        Start     Ordered    02/23/25 1511  Discharge patient  Once         02/23/25 1511    02/23/25 0800  Sitz Bath  3 Times Daily,   Status:  Canceled        Comments: PRN    02/22/25 1935 02/23/25 0600  CBC & Differential  Morning Draw        Comments: Postpartum Day 1      02/22/25 1935 02/23/25 0600  CBC Auto Differential  PROCEDURE ONCE        Comments: Postpartum Day 1      02/22/25 2202 02/23/25 0000  bisacodyl (DULCOLAX) suppository 10 mg  Daily PRN,   Status:  Discontinued         02/22/25 1935 02/23/25 0000  ibuprofen (ADVIL,MOTRIN) 600 MG tablet  Every 6 Hours PRN         02/23/25 1511    02/22/25 2100  docusate sodium (COLACE) capsule 100 mg  2 Times Daily,   Status:  Discontinued         02/22/25 1935 02/22/25 1936  Transfer Patient  Once         02/22/25 1935 02/22/25 1936  Code Status and Medical Interventions: CPR (Attempt to Resuscitate); Full  Continuous,   Status:  Canceled         02/22/25 1935 02/22/25 1936  Vital Signs Per Hospital Policy  Per Hospital Policy/Protocol,   Status:  Canceled         02/22/25 1935 02/22/25 1936  Notify Provider  Continuous,   Status:  Canceled        Comments: Open Order Report to View Parameters Requiring Provider Notification    02/22/25 1935 02/22/25 1936  Up Ad Shirley  Until Discontinued,   Status:  Canceled         02/22/25 1935    02/22/25 1936  Ambulate Patient  Every Shift,   Status:  Canceled       02/22/25 1935 02/22/25 1936  Diet: Regular/House; Fluid  Consistency: Thin (IDDSI 0)  Diet Effective Now,   Status:  Canceled         02/22/25 1935 02/22/25 1936  Advance Diet As Tolerated -Regular  Until Discontinued,   Status:  Canceled         02/22/25 1935 02/22/25 1936  Fundal and Lochia Check  Per Order Details,   Status:  Canceled        Comments: Every 30 minutes x2, then Every Shift    02/22/25 1935 02/22/25 1936  RN to Assess Rh Status & Place RhIG Evaluation Order if Indicated  Continuous,   Status:  Canceled         02/22/25 1935 02/22/25 1936  Bladder Assessment  Per Order Details,   Status:  Canceled        Comments: Postpartum 1) Upon Admission to Unit & Every 4 Hours PRN Until Voiding. 2) Out of Bed to Void in 8 Hours.    02/22/25 1935 02/22/25 1936  Straight Cath  Per Order Details,   Status:  Canceled        Comments: Postpartum: If Distended & Unable to Void, May Repeat Once.    02/22/25 1935 02/22/25 1936  Indwelling Urinary Catheter  Per Order Details,   Status:  Canceled        Comments: Postpartum : After Straight Cathed x2 or if Greater Than 1000mL Residual, Insert Indwelling Urinary Catheter Until Further MD Order.    02/22/25 1935 02/22/25 1936  Apply Ice to Perineum  Per Order Details,   Status:  Canceled        Comments: For 20 Minutes Every 2 Hours    02/22/25 1935 02/22/25 1936  Waffle Cushion  Per Order Details,   Status:  Canceled        Comments: For Perineal Discomfort    02/22/25 1935 02/22/25 1936  Donut Ring  Per Order Details,   Status:  Canceled        Comments: For Perineal Pain    02/22/25 1935 02/22/25 1936  Kpad  Per Order Details,   Status:  Canceled        Comments: For Pain    02/22/25 1935 02/22/25 1936  Breast pump to bed  Once,   Status:  Canceled         02/22/25 1935 02/22/25 1936  If indicated -- Please administer RH Immunoglobulin based on results of cord blood evaluation and fetal screen lab tests, pharmacy to dispense  Per Order Details,   Status:  Canceled        Comments: See  "Process Instructions For Reference Range Details.    02/22/25 1935 02/22/25 1936  VTE Prophylaxis Not Indicated: Low Risk; Obesity - BMI >30 (1)  Once         02/22/25 1935 02/22/25 1935  Apply witch hazel pads / TUCKS to perineum as needed for comfort PRN  As Needed,   Status:  Canceled       02/22/25 1935    02/22/25 1935  Warm compress  As Needed,   Status:  Canceled       02/22/25 1935 02/22/25 1935  Apply ace wrap, tight bra, or binder  As Needed,   Status:  Canceled       02/22/25 1935 02/22/25 1935  Apply ice packs  As Needed,   Status:  Canceled       02/22/25 1935 02/22/25 1935  sodium chloride 0.9 % flush 1-10 mL  As Needed,   Status:  Discontinued         02/22/25 1935 02/22/25 1935  oxytocin (PITOCIN) 30 units in 0.9% sodium chloride 500 mL (premix)  Once As Needed,   Status:  Discontinued         02/22/25 1935 02/22/25 1935  acetaminophen (TYLENOL) tablet 650 mg  Every 6 Hours PRN,   Status:  Discontinued         02/22/25 1935 02/22/25 1935  HYDROcodone-acetaminophen (NORCO) 5-325 MG per tablet 1 tablet  Every 4 Hours PRN,   Status:  Discontinued        Placed in \"Or\" Linked Group    02/22/25 1935 02/22/25 1935  HYDROcodone-acetaminophen (NORCO)  MG per tablet 1 tablet  Every 4 Hours PRN,   Status:  Discontinued        Placed in \"Or\" Linked Group    02/22/25 1935 02/22/25 1935  magnesium hydroxide (MILK OF MAGNESIA) suspension 10 mL  Daily PRN,   Status:  Discontinued         02/22/25 1935 02/22/25 1935  benzocaine-menthol (DERMOPLAST) 20-0.5 % topical spray  As Needed,   Status:  Discontinued         02/22/25 1935 02/22/25 1935  Hydrocort-Pramoxine (Perianal) (PROCTOFOAM-HS) 1-1 % rectal foam 1 Application  As Needed,   Status:  Discontinued         02/22/25 1935 02/22/25 1935  Hydrocortisone (Perianal) (ANUSOL-HC) 2.5 % rectal cream 1 Application  As Needed,   Status:  Discontinued         02/22/25 1935 02/22/25 1935  benzocaine (AMERICAINE) 20 % rectal " "ointment 1 Application  As Needed,   Status:  Discontinued         02/22/25 1935 02/22/25 1935  miSOPROStol (CYTOTEC) tablet 600 mcg  Once As Needed,   Status:  Discontinued         02/22/25 1935 02/22/25 1935  methylergonovine (METHERGINE) injection 200 mcg  Once As Needed,   Status:  Discontinued         02/22/25 1935 02/22/25 1935  tranexamic acid 1000 mg in 100 mL 0.7% NaCl infusion (premix)  Once As Needed,   Status:  Discontinued         02/22/25 1935 02/22/25 1935  lanolin topical 1 Application  Every 1 Hour PRN,   Status:  Discontinued         02/22/25 1935 02/22/25 1615  ceFAZolin 1000 mg IVPB in 100 mL NS (MBP)  Every 8 Hours,   Status:  Discontinued        Placed in \"Followed by\" Linked Group    02/22/25 0804    02/22/25 1535  ibuprofen (ADVIL,MOTRIN) tablet 600 mg  Every 6 Hours PRN,   Status:  Discontinued         02/22/25 1535    02/22/25 1411  phytonadione (VITAMIN K) 1 MG/0.5ML injection  - ADS Override Pull        Note to Pharmacy: Created by cabinet override    02/22/25 1411    02/22/25 1411  erythromycin (ROMYCIN) 5 MG/GM ophthalmic ointment  - ADS Override Pull        Note to Pharmacy: Created by cabinet override    02/22/25 1411    02/22/25 1245  oxytocin (PITOCIN) 30 units in 0.9% sodium chloride 500 mL (premix)  Continuous        Placed in \"Followed by\" Linked Group    02/22/25 1140    02/22/25 1200  Vital Signs q 4 while awake  Every 4 Hours,   Status:  Canceled      Comments: While the patient is awake.    02/22/25 0804    02/22/25 1140  oxytocin (PITOCIN) 30 units in 0.9% sodium chloride 500 mL (premix)  Once        Placed in \"Followed by\" Linked Group    02/22/25 1140    02/22/25 1140  methylergonovine (METHERGINE) injection 200 mcg  Once As Needed,   Status:  Discontinued         02/22/25 1140 02/22/25 1140  carboprost (HEMABATE) injection 250 mcg  Every 15 Minutes PRN,   Status:  Discontinued         02/22/25 1140    02/22/25 1140  miSOPROStol (CYTOTEC) tablet 800 mcg  " "Once As Needed,   Status:  Discontinued         02/22/25 1140    02/22/25 1000  fentaNYL 2mcg/mL and ropivacaine 0.2% in NS epidural 100mL  Continuous,   Status:  Discontinued         02/22/25 0904 02/22/25 0905  Vital Signs Per Anesthesia Guidelines  Per Order Details,   Status:  Canceled        Comments: Every 2 Minutes x5, Every 5 Minutes x4, Then If Stable Every 15 Minutes    02/22/25 0904    02/22/25 0905  Start IV with #16 or #18 gauge angiocath.  Once,   Status:  Canceled         02/22/25 0904    02/22/25 0905  Fetal Heart Rate Monitor  Once,   Status:  Canceled         02/22/25 0904    02/22/25 0905  Nurse or anesthesiologist to remain with patient for 15 minutes following dosing.  Until Discontinued,   Status:  Canceled         02/22/25 0904    02/22/25 0905  Facilitate maternal postion on side and maintain uterine displacement.  Until Discontinued,   Status:  Canceled         02/22/25 0904    02/22/25 0905  Consult anesthesia services prior to changing epidural infusion/rate.  Until Discontinued,   Status:  Canceled         02/22/25 0904    02/22/25 0905  Nurse may remove epidural catheter after delivery.  Until Discontinued,   Status:  Canceled         02/22/25 0904    02/22/25 0905  Insert Indwelling Urinary Catheter  Once,   Status:  Canceled        Placed in \"And\" Linked Group    02/22/25 0904 02/22/25 0905  Assess Need for Indwelling Urinary Catheter - Follow Removal Protocol  Continuous,   Status:  Canceled        Comments: Indwelling Urinary Catheter Removal Criteria  Discontinue Indwelling Urinary Catheter Unless One of the Following is Present:  Urinary Retention or Obstruction  Chronic Urinary Catheter Use  End of Life  Critical Illness with Strict I/O   Tract or Abdominal Surgery  Stage 3/4 Sacral / Perineal Wound  Required Activity Restriction: Trauma  Required Activity Restriction: Spine Surgery  If Patient is Being Followed by Urology Contact Them PRIOR to Removal  Do Not Remove " "Indwelling Urinary Catheter Order is Present with a CLINICAL REASON to Maintain the Catheter. Provider is Required to Include a Clinical Reason to Maintain a Urinary Catheter    Patient Admitted With Indwelling Urinary Catheter (Not Placed at [facility])  Assess for Continued Need & Document Medical Necessity  If Infection is Suspected, Contact the Provider       Placed in \"And\" Linked Group    02/22/25 0904 02/22/25 0905  Urinary Catheter Care  Every Shift,   Status:  Canceled      Placed in \"And\" Linked Group    02/22/25 0904    02/22/25 0905  Notify physician for the following conditions:  Until Discontinued,   Status:  Canceled         02/22/25 0904    02/22/25 0904  ePHEDrine injection 5 mg  Every 15 Minutes PRN,   Status:  Discontinued         02/22/25 0904 02/22/25 0904  ondansetron (ZOFRAN) injection 4 mg  Once As Needed,   Status:  Discontinued         02/22/25 0904 02/22/25 0904  famotidine (PEPCID) injection 20 mg  Once As Needed         02/22/25 0904 02/22/25 0904  diphenhydrAMINE (BENADRYL) injection 12.5 mg  Every 8 Hours PRN,   Status:  Discontinued         02/22/25 0904 02/22/25 0900  sodium chloride 0.9 % flush 10 mL  Every 12 Hours Scheduled,   Status:  Discontinued         02/22/25 0804 02/22/25 0900  lactated ringers infusion  Continuous,   Status:  Discontinued         02/22/25 0804 02/22/25 0900  mineral oil liquid 30 mL  Once,   Status:  Discontinued         02/22/25 0804 02/22/25 0845  ceFAZolin 2000 mg IVPB in 100 mL NS (MBP)  Once        Placed in \"Followed by\" Linked Group    02/22/25 0804 02/22/25 0805  Code Status and Medical Interventions: CPR (Attempt to Resuscitate); Full Support  Continuous,   Status:  Canceled         02/22/25 0804    02/22/25 0805  Obtain Informed Consent  Once,   Status:  Canceled         02/22/25 0804    02/22/25 0805  Mini-Prep Prior to Delivery  Once,   Status:  Canceled         02/22/25 0804 02/22/25 0805  Continuous Fetal " Monitoring With NST on Admission and Prior to Initiation of Oxytocin.  Per Order Details,   Status:  Canceled        Comments: Continuous Fetal Monitoring With NST on Admission & Prior to Initiation of Oxytocin.    25  External Uterine Contraction Monitoring  Per Hospital Policy/Protocol,   Status:  Canceled         2504    25  Notify Provider (Specified)  Continuous,   Status:  Canceled        Comments: Open Order Report to View Parameters Requiring Provider Notification    25  Notify Provider of Tachysystole (Per Hospital Algorithm)  Continuous,   Status:  Canceled        Comments: Open Order Report to View Parameters Requiring Provider Notification    25  Notify Provider if Membranes Ruptured, Bleeding Greater Than 1 Pad Per Hour, Fetal Heart Tone Abnormality or Severe Pain  Continuous,   Status:  Canceled        Comments: Open Order Report to View Parameters Requiring Provider Notification    25  Weigh Patient Daily  Daily,   Status:  Canceled       25  Initiate Group Beta Strep (GBS) Prophylaxis Protocol, If Criteria Met  Continuous,   Status:  Canceled        Comments: NO TREATMENT RECOMMENDED IF: 1) Maternal GBS Status Known Negative 2) Scheduled  Birth With Intact Membranes, Not in Labor 3) Maternal GBS Status Unknown, No Risk Factors  TREAT WITH ANTIBIOTICS IF:  1) Maternal GBS Status Known Positive 2) Maternal GBS Status Unknown With Risk Factors: a)  Previous Infant Affected By GBS Infection b) GBS Urinary Tract Infection (UTI) or Bacteriuria During Pregnancy c) Unexplained Maternal Fever (100.4F (38C) or Greater) During Labor d)  Prolonged Rupture of Membranes (18 or More Hours) e) Gestational Age Less Than 37 Weeks    25  CBC (No Diff)  STAT         25  Treponema pallidum AB w/Reflex  RPR  Once         02/22/25 0804    02/22/25 0805  Type & Screen  STAT         02/22/25 0804    02/22/25 0805  Insert Peripheral IV  Once,   Status:  Canceled         02/22/25 0804    02/22/25 0805  Saline Lock & Maintain IV Access  Continuous,   Status:  Canceled         02/22/25 0804    02/22/25 0805  Diet: Liquid; Clear Liquid; Fluid Consistency: Thin (IDDSI 0)  Diet Effective Now,   Status:  Canceled         02/22/25 0804    02/22/25 0804  Position Change - For Intra-Uterine Resusitation for Hypertonus, HyperStimulation or Non-Reassuring Fetal Status  As Needed,   Status:  Canceled       02/22/25 0804    02/22/25 0804  sodium chloride 0.9 % flush 10 mL  As Needed,   Status:  Discontinued         02/22/25 0804    02/22/25 0804  sodium chloride 0.9 % infusion 40 mL  As Needed,   Status:  Discontinued         02/22/25 0804    02/22/25 0804  lidocaine (XYLOCAINE) 1 % injection 0.5 mL  Once As Needed,   Status:  Discontinued         02/22/25 0804    02/22/25 0804  lactated ringers bolus 1,000 mL  Once As Needed         02/22/25 0804    02/22/25 0804  acetaminophen (TYLENOL) tablet 650 mg  Every 4 Hours PRN,   Status:  Discontinued         02/22/25 0804    02/22/25 0756  VTE Prophylaxis Not Indicated: Low Risk; No Risk Factors (0)  Once         02/22/25 0758    02/22/25 0755  Admit To Obstetrics Inpatient  Once         02/22/25 0758    02/22/25 0740  Vital Signs  Per Hospital Policy/Protocol,   Status:  Canceled        Comments: Repeat blood pressure every 30 minutes, until specified.    02/22/25 0740    02/22/25 0740  Fetal Nonstress Test  Once,   Status:  Canceled        Comments: For any patient >= 24 wks gestation.    02/22/25 0740    02/22/25 0740  Pulse Oximetry, Spot  Once,   Status:  Canceled         02/22/25 0740    02/22/25 0740  Urinalysis With Culture If Indicated - Urine, Clean Catch  Once,   Status:  Canceled        Comments: Collect and Hold for gestational age > 24 weeks      02/22/25 0740    02/22/25  40  Obtain Fetal Heart Rate - For Gestational Age <24 weeks  Once,   Status:  Canceled         25 0740    25 07  Continuous Uterine Monitoring - For Gestational Age >24 weeks  Once,   Status:  Canceled         25 0740    25 0740  NPO Diet NPO Type: Strict NPO  Diet Effective Now,   Status:  Canceled         25 0740    25  Vaginal Exam  Once,   Status:  Canceled        Comments: Perform unless patient has vaginal bleeding without known placental site, known placental previa, <36 weeks with no abdominal , or complain of ROM and <36 weeks    25 0740    Signed and Held  Notify Provider (Specified)  Continuous        Comments: Open Order Report to View Parameters Requiring Provider Notification    Signed and Held    Signed and Held  Vital Signs Per Hospital Policy  Per Hospital Policy/Protocol         Signed and Held    Signed and Held  Fundal & Lochia Check  Per Order Details      Comments: Every 15 Minutes x4, Then Every 30 Minutes x2, Then Every Shift    Signed and Held    Signed and Held  Fundal & Lochia Check  Every Shift       Signed and Held    Signed and Held  Diet: Regular/House; Fluid Consistency: Thin (IDDSI 0)  Diet Effective Now         Signed and Held    Signed and Held  Transfer to postpartum when discharge criteria met.  Until Discontinued         Signed and Held    Signed and Held  oxytocin (PITOCIN) 30 units in 0.9% sodium chloride 500 mL (premix)  Once As Needed         Signed and Held                     Operative/Procedure Notes (last 72 hours)        Anahi Cobian MD at 25 [facility]           [facility]   Vaginal Delivery Note    Patient Name: Thomas Whitman  : 1990  MRN: [id number]    Date of Delivery: 2025    Diagnosis     Pre & Post-Delivery:  Intrauterine pregnancy at 39w3d  Labor status: Spontaneous Onset of Labor    Normal labor    Vaginal delivery             Problem List    Transfer to Postpartum     Review the  Delivery Report for details.     Delivery     Delivery: Vaginal, Spontaneous    YOB: 2027   Time of Birth:  Gestational Age 2:34 PM  39w3d     Anesthesia: Epidural    Delivering clinician: Anahi Cobian   Forceps?   No   Vacuum? No    Shoulder dystocia present: No        Delivery narrative:  35 year old G 2 P 1 was admitted for labor. She had reassuring fetal heart tones and received epidural for pain control. She progressed spontaneously and underwent amniotomy with clear fluid at 1400. She progressed to second stage and delivered via spontaneous vaginal delivery after a few pushes. The shoulders and body delivered easily and the infant was placed on her chest for kangaroo care. The umbilical cord was clamped and cut but the patient's mother after 30 seconds. The placenta delivered spontaneously and appeared intact. Manual exploration of the uterus did not reveal retained products. Katie urethral lacerations were made hemostatic with figure of eight stitches of 4-0 monocryl. A second degree midline laceration was noted and repaired. The cervix and vagina appeared intact. All counts were correct following.                Addendum: 2/23/25 @ 1614: pt received kefzol for GBS prophylaxis.      Infant     Findings: male infant     Infant observations: Weight: No birth weight on file.  Length:   in  Observations/Comments:  Scale 3     Apgars: 8  @ 1 minute /    9  @ 5 minutes   Infant Name: August     Placenta & Cord         Placenta delivered  Spontaneous at   2/22/2025  2:40 PM    Cord: 3 vessels present.   Nuchal Cord?  no   Cord blood obtained: Yes   Cord gases obtained:  No             Repair     Episiotomy: None    No    Lacerations: Yes  Laceration Information  Laceration Repaired?   Perineal: 2nd Yes   Periurethral:   Yes   Labial:       Sulcus:       Vaginal:       Cervical:         Suture used for repair: 3-0, 4-0 Vicryl     Estimated Blood Loss:  See QBL     Quantitative Blood Loss:  Quantitative Blood Loss (mL): 426 mL (25 1500)        Complications     none    Disposition     Mother to Mother Baby/Postpartum  in stable condition currently.  Baby to NBN  in stable condition currently.    Anahi Cobian MD  25  15:16 EST          Electronically signed by Anahi Cobian MD at 25 1615          Physician Progress Notes (last 72 hours)        Zee Trent MD at 25 1108          Postpartum Vaginal Delivery Note PPD#1    CC: PPD 1 s/p     Assessment:   Pt doing well. Pain well controlled. Lochia normal. Ambulating well. Tolerating regular diet. Voiding without difficulty. No complaints.  Desires infant circ, RBA discussed    Review of Systems - Negative except cramping    Vitals:   /71 (BP Location: Left arm, Patient Position: Lying)   Pulse 94   Temp 98.3 °F (36.8 °C) (Oral)   Resp 16   Wt 85.3 kg (188 lb)   LMP 2024 (Approximate)   SpO2 100%   Breastfeeding Yes   BMI 32.27 kg/m²         Exam:   Gen: NAD, cooperative, conversive  Neck:  No LAD or TM  Lungs: non labored breathing  Heart:  RRR  Abd: Soft, nondistended, fundus is firm below umbillicus, approp tender  Ext: Nontender bilaterally, trace edema bilateral    Labs:  Lab Results (last 24 hours)       Procedure Component Value Units Date/Time    CBC & Differential [[id number]]  (Abnormal) Collected: 25    Specimen: Blood Updated: 25    Narrative:      The following orders were created for panel order CBC & Differential.  Procedure                               Abnormality         Status                     ---------                               -----------         ------                     CBC Auto Differential[[id number]]        Abnormal            Final result                 Please view results for these tests on the individual orders.    CBC Auto Differential [[id number]]  (Abnormal) Collected: 25    Specimen: Blood Updated: 25     WBC  10.17 10*3/mm3      RBC 3.40 10*6/mm3      Hemoglobin 10.2 g/dL      Hematocrit 29.5 %      MCV 86.8 fL      MCH 30.0 pg      MCHC 34.6 g/dL      RDW 11.9 %      RDW-SD 37.8 fl      MPV 11.0 fL      Platelets 189 10*3/mm3      Neutrophil % 72.8 %      Lymphocyte % 18.9 %      Monocyte % 6.4 %      Eosinophil % 0.6 %      Basophil % 0.3 %      Immature Grans % 1.0 %      Neutrophils, Absolute 7.41 10*3/mm3      Lymphocytes, Absolute 1.92 10*3/mm3      Monocytes, Absolute 0.65 10*3/mm3      Eosinophils, Absolute 0.06 10*3/mm3      Basophils, Absolute 0.03 10*3/mm3      Immature Grans, Absolute 0.10 10*3/mm3      nRBC 0.0 /100 WBC             Assessment: Thomas Whitman is a 35 y.o. female  s/p   Patient Active Problem List   Diagnosis    HPV in female    Nausea    Gastroesophageal reflux disease without esophagitis    Antepartum multigravida of advanced maternal age    GBS (group B Streptococcus carrier), +RV culture, currently pregnant    Normal labor    Vaginal delivery       Plan:  1. Routine Postpartum care  2. Ambulation encouraged.  3. Pt desires discharge today         Signed By:  Zee Trent MD       2025 11:08 EST          Electronically signed by Zee Trent MD at 25 1510       Anahi Cobian MD at 25 1405          [facility]  Obstetric Progress Note    Subjective     Patient:    The patient feels comfortable with epidural.     Objective     Vital Signs Range for the last 24 hours  Temp:  [98 °F (36.7 °C)-98.4 °F (36.9 °C)] 98.4 °F (36.9 °C)   Temp src: Oral   BP: ()/(40-92) 100/55   Heart Rate:  [] 90   Resp:  [16] 16   SpO2:  [98 %-100 %] 100 %           Weight:  [85.3 kg (188 lb)] 85.3 kg (188 lb)       Flowsheet Rows      Flowsheet Row First Filed Value   Admission Height --   Admission Weight 85.3 kg (188 lb) Documented at 2025 0917            Intake/Output last 24 hours:    No intake or output data in the 24 hours ending 25  1405    Intake/Output this shift:    No intake/output data recorded.    Physical Exam:  General: Patient is comfortable and in no acute distress                     Presentation: vtx   Cervix: Exam by: Method: sterile vaginal exam performed   Dilation: Cervical Dilation (cm): 9   Effacement: Cervical Effacement: 100   Station:    AROM with clear fluid noted following verbal consent         Fetal Heart Rate Assessment   Method: Fetal HR Assessment Method: external   Beats/min: Fetal HR (beats/min): 125   Baseline: Fetal HR Baseline: normal range   Variability: Fetal HR Variability: moderate (amplitude range 6 to 25 bpm)   Accels: Fetal HR Accelerations: greater than/equal to 15 bpm, lasting at least 15 seconds   Decels: Fetal HR Decelerations: absent   Tracing Category:       Uterine Assessment   Method: Method: external tocotransducer, palpation   Frequency (min): Contraction Frequency (Minutes): 6-9   Ctx Count in 10 min:     Duration:     Intensity: Contraction Intensity: moderate by palpation   Intensity by IUPC:     Resting Tone: Uterine Resting Tone: soft by palpation   Resting Tone by IUPC:     [place] Units:         Assessment & Plan       Normal labor        Assessment:  1.  Intrauterine pregnancy at 39w3d gestation with reactive fetal status.    2.  labor  with ROM  3.  Obstetrical history significant for  prior vag delivery .  4.  GBS status:   Strep Gp B LINUS   Date Value Ref Range Status   2025 Positive (A) Negative Final     Comment:     Centers for Disease Control and Prevention (CDC) and American Congress  of Obstetricians and Gynecologists (ACOG) guidelines for prevention of   group B streptococcal (GBS) disease specify co-collection of  a vaginal and rectal swab specimen to maximize sensitivity of GBS  detection. Per the CDC and ACOG, swabbing both the lower vagina and  rectum substantially increases the yield of detection compared with  sampling the vagina alone.  Penicillin G,  ampicillin, or cefazolin are indicated for intrapartum  prophylaxis of  GBS colonization. Reflex susceptibility  testing should be performed prior to use of clindamycin only on GBS  isolates from penicillin-allergic women who are considered a high risk  for anaphylaxis. Treatment with vancomycin without additional testing  is warranted if resistance to clindamycin is noted.         Plan:  1. fetal and uterine monitoring  continuously, analgesia with  epidural, and antibiotic for GBS  2. Plan of care has been reviewed with patient and partner  3.  Risks, benefits of treatment plan have been discussed.  4.  All questions have been answered.        Anahi Cobian MD  2025  14:05 EST        Electronically signed by Anahi Cboian MD at 25 1407       Consult Notes (last 72 hours)  Notes from 25 1522 through 25 1522   No notes of this type exist for this encounter.          Discharge Summary        Zee Trent MD at 25 1511          [facility]  Delivery Discharge Summary    Discharge Diagnosis:     Vaginal delivery      Patient Active Problem List   Diagnosis    HPV in female    Nausea    Gastroesophageal reflux disease without esophagitis    Antepartum multigravida of advanced maternal age    GBS (group B Streptococcus carrier), +RV culture, currently pregnant    Normal labor    Vaginal delivery        Primary OB Clinician: [facility]    EDC: Estimated Date of Delivery: 25    Gestational Age:39w3d    Antepartum complications: none    Date of Delivery: 2025  Time of Delivery: 2:34 PM    Delivered By:  Anahi Cobian    Delivery Type: Vaginal, Spontaneous     Tubal Ligation: n/a    Baby: male infant;   Apgar:  8  @ 1 minute /   Apgar:  9  @ 5 minutes        Anesthesia: Epidural     Intrapartum complications: None    Laceration: No    Episiotomy: No    Placenta: Spontaneous    Feeding method: Breastfeeding Status: Yes    Rh Immune globulin given: not  applicable    Rubella vaccine given: no    Discharge Date: 2/23/2025; Discharge Time: 15:12 EST    Early Discharge:  NO  Hospital Course:  The patient was admitted following delivery.  She did well postpartum with normal return of bowel function and remained afebrile.    Blood type: O+  Rubella immune  dTap given prenatally        Plan:    Follow-up appointment with Dr Lemus in 2 weeks.     Electronically signed by Zee Trent MD at 02/23/25 0651

## 2025-02-24 NOTE — OUTREACH NOTE
Maternal Screening Survey      Flowsheet Row Responses   Eligibility Eligible   Prep survey completed? Yes   Facility patient discharged from? [facility] - Registered Nurse

## 2025-02-28 ENCOUNTER — TELEPHONE (OUTPATIENT)
Dept: OBSTETRICS AND GYNECOLOGY | Age: 35
End: 2025-02-28

## 2025-02-28 NOTE — TELEPHONE ENCOUNTER
Caller: ALBIN     Relationship: [employer]     Best call back number: [phone]    What is the best time to reach you: 8AM - 8PM    Who are you requesting to speak with (clinical staff, provider,  specific staff member): CLINICAL     What was the call regarding: TO VERIFY INFORMATION - FAXED OVER PAPERWORK ON 02/26/25.      CLAIM NUMBER: [id number]

## 2025-03-04 ENCOUNTER — MATERNAL SCREENING (OUTPATIENT)
Dept: CALL CENTER | Facility: HOSPITAL | Age: 35
End: 2025-03-04
Payer: COMMERCIAL

## 2025-03-04 NOTE — OUTREACH NOTE
Maternal Screening Survey      Flowsheet Row Responses   Facility patient discharged from? [facility]   Attempt successful? No   Unsuccessful attempts Attempt 1              [facility] - Registered Nurse

## 2025-03-04 NOTE — OUTREACH NOTE
Maternal Screening Survey      Flowsheet Row Responses   Facility patient discharged from? [facility]   Attempt successful? Yes   Call start time 1230   Call end time 1231   Person spoke with today (if not patient) and relationship Patient   I have been able to laugh and see the funny side of things. 0   I have looked forward with enjoyment to things. 0   I have blamed myself unnecessarily when things went wrong. 0   I have been anxious or worried for no good reason. 0   I have felt scared or panicky for no good reason. 0   Things have been getting on top of me. 0   I have been so unhappy that I have had difficulty sleeping. 0   I have felt sad or miserable. 0   I have been so unhappy that I have been crying. 0   The thought of harming myself has occurred to me. 0   [place]  Depression Scale Total 0   Did any of your parents have problems with alcohol or drug use? No   Do any of your peers have problems with alcohol or drug use? No   Does your partner have problems with alcohol or drug use? No   Before you were pregnant did you have problems with alcohol or drug use? (past) No   In the past month, did you drink beer, wine, liquor or use any other drugs? (pregnancy) No   Maternal Screening call completed Yes   Call end time 1231              [facility] - Registered Nurse

## 2025-03-13 ENCOUNTER — POSTPARTUM VISIT (OUTPATIENT)
Dept: OBSTETRICS AND GYNECOLOGY | Age: 35
End: 2025-03-13
Payer: COMMERCIAL

## 2025-03-13 ENCOUNTER — TELEPHONE (OUTPATIENT)
Dept: OBSTETRICS AND GYNECOLOGY | Age: 35
End: 2025-03-13

## 2025-03-13 VITALS
BODY MASS INDEX: 28.95 KG/M2 | HEIGHT: 64 IN | SYSTOLIC BLOOD PRESSURE: 104 MMHG | WEIGHT: 169.6 LBS | DIASTOLIC BLOOD PRESSURE: 68 MMHG

## 2025-03-13 PROBLEM — R11.0 NAUSEA: Status: RESOLVED | Noted: 2020-01-16 | Resolved: 2025-03-13

## 2025-03-13 PROBLEM — O99.820 GBS (GROUP B STREPTOCOCCUS CARRIER), +RV CULTURE, CURRENTLY PREGNANT: Status: RESOLVED | Noted: 2025-02-18 | Resolved: 2025-03-13

## 2025-03-13 PROBLEM — O09.529 ANTEPARTUM MULTIGRAVIDA OF ADVANCED MATERNAL AGE: Status: RESOLVED | Noted: 2024-07-09 | Resolved: 2025-03-13

## 2025-03-13 PROBLEM — Z37.9 NORMAL LABOR: Status: RESOLVED | Noted: 2025-02-22 | Resolved: 2025-03-13

## 2025-03-13 NOTE — TELEPHONE ENCOUNTER
Caller: Thomas Whitman    Relationship to Patient: Self    Phone Number: [phone]; OK TO LEAVE A      Reason for Call: REGARDING POSTPARTUM APT. PATIENT IS SCHEDULED FOR 04/21 AND WAS TOLD THERE WAS A SOONER APT FOR 04/16 THAT I AM NOT SEEING

## 2025-03-13 NOTE — PROGRESS NOTES
Subjective       History of Present Illness  Thomas Whitman is a 35 y.o. female is being seen today for 2-week postdelivery evaluation  Patient is doing quite well, still has a little discomfort from her episiotomy and repair, minimal cramps only taking ibuprofen.  Is not have any unusual bleeding or discharge  Her infant son is doing well  Blood pressure was normal today    Chief Complaint   Patient presents with    Postpartum Care     Pp ck pt is 2wks5d pt delivery vag to baby boy  Pt is breastfeeding cc: no complaints today    .        The following portions of the patient's history were reviewed and updated as appropriate: allergies, current medications, past family history, past medical history, past social history, past surgical history and problem list.    PAST MEDICAL HISTORY  Past Medical History:   Diagnosis Date    Abnormal Pap smear of cervix     Ascus, +hpv,2018    Anemia 2019    r/t GI bleeding    ASCUS with positive high risk HPV cervical     2018    Constipation     GI (gastrointestinal bleed) 10/2018-2019    resolved    History of chicken pox     History of shingles     HPV in female     UTI (urinary tract infection)      OB History    Para Term  AB Living   3 2 2   2   SAB IAB Ectopic Molar Multiple Live Births       1 2      # Outcome Date GA Lbr Cortez/2nd Weight Sex Type Anes PTL Lv   3 Term 23 39w3d  3230 g (7 lb 1.9 oz) M Vag-Spont EPI N PAWAN      Birth Comments: Scale 3   2 Term 20 40w5d 09:24 / 01:51 3300 g (7 lb 4.4 oz) M Vag-Spont EPI N PAWAN      Birth Comments: panda LR 6   1A             1B               Past Surgical History:   Procedure Laterality Date    COLONOSCOPY  2019    chronic rectal inflamation    COLONOSCOPY W/ BIOPSIES  2019    friable (with spontaneous bleeding) and granular mucosa in rectum NBIH, per path chronic proctitis    D & C WITH SUCTION N/A 2024    Procedure: SUCTION  DILATATION AND CURETTAGE;  Surgeon:  Tye Barkley MD;  Location: [facility] MAIN OR;  Service: Obstetrics/Gynecology;  Laterality: N/A;    ENDOSCOPY N/A 2/4/2020    Procedure: ESOPHAGOGASTRODUODENOSCOPY with cold bx;  Surgeon: Mandy Florence MD;  Location: [facility] ENDOSCOPY;  Service: Gastroenterology;  Laterality: N/A;  pre - epigastric pain, n/vpost - gastritis    WISDOM TOOTH EXTRACTION       Family History   Problem Relation Age of Onset    Multiple sclerosis Father     Atrial fibrillation Maternal Grandmother     Diabetes Maternal Grandfather     Colon polyps Maternal Uncle     Colon polyps Maternal Uncle     Irritable bowel syndrome Mother     Irritable bowel syndrome Maternal Aunt      Social History     Tobacco Use   Smoking Status Former    Types: Electronic Cigarette   Smokeless Tobacco Current   Tobacco Comments    quit cigarettes 11/2017.  use vaporizer since       Current Outpatient Medications:     ibuprofen (ADVIL,MOTRIN) 600 MG tablet, Take 1 tablet by mouth Every 6 (Six) Hours As Needed for Mild Pain (First Line: Mild pain.)., Disp: 30 tablet, Rfl: 0    Prenatal Vit-Fe Fumarate-FA (Prenatal Vitamin) 27-0.8 MG tablet, Take 1 capsule by mouth Daily., Disp: 30 tablet, Rfl: 11  Immunization History   Administered Date(s) Administered    ABRYSVO (RSV, 60+ or pregnant women 32-36 wks) 01/02/2025    COVID-19 (PFIZER) BIVALENT 12+YRS 05/24/2023    Fluzone  >6mos 10/30/2024    Fluzone (or Fluarix & Flulaval for VFC) >6mos 12/11/2020, 11/23/2021    Hep B, Adolescent or Pediatric 01/09/2004, 02/11/2004, 08/25/2004    Tdap 10/09/2018, 03/30/2022, 11/27/2024       Review of Systems       Except as outlined in history of physical illness, patient denies any changes in her GYN, , GI systems. All other systems reviewed are negative.    Objective   Physical Exam   Alert and oriented, respirations unlabored, heart regular rate and rhythm   Pelvic deferred      Assessment & Plan   Diagnoses and all orders for this visit:    1. Postpartum follow-up  (Primary)    2-week postpartum visit reassuring  Mentally stable no evidence of postpartum depression  Blood pressure normal  History as outlined above  Will return in 4 weeks time for regular postpartum visit and call us with any change in condition in the meantime             No orders of the defined types were placed in this encounter.          EMR Dragon/ Transcription disclaimer:  Much of the encounter note is an electronic transcription/translation of spoken language to printed text. The electronic translation of spoken language may permit erroneous, or at times, nonessential words or phrases to be inadvertently transcribes; Although i have reviewed the note for such errors, some may still exist.

## 2025-04-16 ENCOUNTER — POSTPARTUM VISIT (OUTPATIENT)
Dept: OBSTETRICS AND GYNECOLOGY | Age: 35
End: 2025-04-16
Payer: COMMERCIAL

## 2025-04-16 VITALS
DIASTOLIC BLOOD PRESSURE: 60 MMHG | HEIGHT: 64 IN | SYSTOLIC BLOOD PRESSURE: 114 MMHG | BODY MASS INDEX: 28.85 KG/M2 | WEIGHT: 169 LBS

## 2025-04-16 RX ORDER — ACETAMINOPHEN AND CODEINE PHOSPHATE 120; 12 MG/5ML; MG/5ML
1 SOLUTION ORAL DAILY
Qty: 28 TABLET | Refills: 11 | Status: SHIPPED | OUTPATIENT
Start: 2025-04-16

## 2025-04-16 NOTE — PROGRESS NOTES
"Subjective     Chief Complaint   Patient presents with    Post-op Follow-up     6 wk pp.check: Vaginal del. 25,breastfeeding,August,male,7lbs.,2oz.       Baby's name:PENNY Whitman is a 35 y.o. female who presents for a postpartum visit. She is 6 weeks postpartum following a spontaneous vaginal delivery. I have fully reviewed the prenatal and intrapartum course. Postpartum course has been uneventful. Baby is feeding breast only. Bleeding has been normal in amount and decreasing.. Bowel function is normal. Bladder function is normal. Patient not sexually active at this time. Contraception method is discussed. Postpartum depression screening: negative.      Postpartum inventory   Infant feeding: (Patient-Rptd) Breast  Postpartum pain: (Patient-Rptd) None  Vaginal bleeding : (Patient-Rptd) Mostly light - only using liner  Depression/Anxiety: (Patient-Rptd) Minimal anxiety  Contracepton: (Patient-Rptd) Birth control pills    Postpartum Depression: Low Risk  (2025)    [place]  Depression Scale     Last EPDS Total Score: 0     Last EPDS Self Harm Result: Unrecognized value       The following portions of the patient's history were reviewed and updated as appropriate: allergies, current medications, past family history, past medical history, past social history, past surgical history and problem list.    Review of Systems  As outlined in HPI    Objective   /60   Ht 162.6 cm (64.02\")   Wt 76.7 kg (169 lb)   LMP 2024 (Approximate)   Breastfeeding Yes   BMI 28.99 kg/m²    General:  alert    Breasts:  normal       Heart:  regular rate and rhytm   Abdomen: Normal findings    Vulva:  normal   Vagina: normal   Cervix:  Normal with no cervical motion tenderness   Corpus: Normal for post partum visit   Adnexa:  Non tender, non enlarged         Diagnoses and all orders for this visit:    1. Postpartum follow-up (Primary)  -     IGP, Apt HPV,rfx 16 / 18,45    Other orders  -     " norethindrone (MICRONOR) 0.35 MG tablet; Take 1 tablet by mouth Daily. 1 pill daily, uses condoms as a backup method for the first cycle  Dispense: 28 tablet; Refill: 11       Normal postpartum exam. Pap smear done at today's visit.     Contraception: discussed, Micronor called in   Slow return to normal activities reviewed. Continue prenatal vitamins.   Follow up in 12 months  Or sooner  as needed.    Problem List Items Addressed This Visit    None  Visit Diagnoses         Postpartum follow-up    -  Primary    Relevant Orders    IGP, Apt HPV,rfx 16 / 18,45                 EMR Dragon/ Transcription disclaimer:  Much of the encounter note is an electronic transcription/translation of spoken language to printed text. The electronic translation of spoken language may permit erroneous, or at times, nonessential words or phrases to be inadvertently transcribes; Although i have reviewed the note for such errors, some may still exist.

## 2025-04-18 LAB
CYTOLOGIST CVX/VAG CYTO: NORMAL
CYTOLOGY CVX/VAG DOC CYTO: NORMAL
CYTOLOGY CVX/VAG DOC THIN PREP: NORMAL
DX ICD CODE: NORMAL
HPV I/H RISK 4 DNA CVX QL PROBE+SIG AMP: NEGATIVE
OTHER STN SPEC: NORMAL
SERVICE CMNT-IMP: NORMAL
STAT OF ADQ CVX/VAG CYTO-IMP: NORMAL

## (undated) DEVICE — Device

## (undated) DEVICE — ST COL BERKELY TBG

## (undated) DEVICE — PK HYSTSCPY D AND C 40

## (undated) DEVICE — GLV SURG SIGNATURE ESSENTIAL PF LTX SZ8

## (undated) DEVICE — TBG W FLTR FOR BERKELEY SYSTEM

## (undated) DEVICE — SACK GZ PERM

## (undated) DEVICE — CANSTR SXN UTER NO FLTR SURG

## (undated) DEVICE — VACURETTE CRV RIGD 8MM DISP

## (undated) DEVICE — STRAP STIRUP WO/ RNG

## (undated) DEVICE — HOSE BT TO BT VAC CURETTAGE SNGL PT USE EA/10